# Patient Record
Sex: MALE | Race: WHITE | NOT HISPANIC OR LATINO | Employment: FULL TIME | URBAN - NONMETROPOLITAN AREA
[De-identification: names, ages, dates, MRNs, and addresses within clinical notes are randomized per-mention and may not be internally consistent; named-entity substitution may affect disease eponyms.]

---

## 2022-03-24 DIAGNOSIS — E53.8 DEFICIENCY OF OTHER SPECIFIED B GROUP VITAMINS: ICD-10-CM

## 2022-03-28 RX ORDER — CYANOCOBALAMIN 1000 UG/ML
INJECTION, SOLUTION INTRAMUSCULAR; SUBCUTANEOUS
Qty: 4 ML | Refills: 1 | Status: SHIPPED | OUTPATIENT
Start: 2022-03-28 | End: 2022-04-22

## 2022-03-28 NOTE — TELEPHONE ENCOUNTER
Rx Refill Note  Requested Prescriptions     Pending Prescriptions Disp Refills   • cyanocobalamin 1000 MCG/ML injection [Pharmacy Med Name: CYANOCOBALAMIN 1,000 MCG/ML VL] 4 mL 1     Sig: INJECT 1 MILLILITER BY INTRAMUSCULAR ROUTE EACH WEEK FOR 4 WEEKS      Last office visit with prescribing clinician 01/17/2022  Next office visit with prescribing clinician none  3}     Please add Last Relevant Lab Date if appropriate 01/17/2022201/17/20223}    Is Refill Pharmacy correct yes23}  Matthew Escobar MA  03/28/22, 12:48 EDT

## 2022-04-22 DIAGNOSIS — E53.8 DEFICIENCY OF OTHER SPECIFIED B GROUP VITAMINS: ICD-10-CM

## 2022-04-22 NOTE — TELEPHONE ENCOUNTER
Rx Refill Note    Requested Prescriptions     Pending Prescriptions Disp Refills   • cyanocobalamin 1000 MCG/ML injection [Pharmacy Med Name: CYANOCOBALAMIN 1,000 MCG/ML VL] 4 mL 0     Sig: INJECT 1 MILLILITER BY INTRAMUSCULAR ROUTE EACH WEEK FOR 4 WEEKS        Last office visit with prescribing clinician: [facility] 01/17/22     Next office visit with prescribing clinician: Visit date not found   Last labs:   Last refill: 03/03/22   Pharmacy (be sure to add in Epic). correct

## 2022-04-23 RX ORDER — CYANOCOBALAMIN 1000 UG/ML
INJECTION, SOLUTION INTRAMUSCULAR; SUBCUTANEOUS
Qty: 4 ML | Refills: 0 | Status: SHIPPED | OUTPATIENT
Start: 2022-04-23 | End: 2022-10-14

## 2022-06-20 ENCOUNTER — OFFICE VISIT (OUTPATIENT)
Dept: FAMILY MEDICINE CLINIC | Facility: CLINIC | Age: 57
End: 2022-06-20

## 2022-06-20 VITALS
WEIGHT: 209 LBS | DIASTOLIC BLOOD PRESSURE: 80 MMHG | RESPIRATION RATE: 14 BRPM | HEART RATE: 80 BPM | BODY MASS INDEX: 27.7 KG/M2 | SYSTOLIC BLOOD PRESSURE: 132 MMHG | HEIGHT: 73 IN

## 2022-06-20 DIAGNOSIS — E11.65 TYPE 2 DIABETES MELLITUS WITH HYPERGLYCEMIA, WITHOUT LONG-TERM CURRENT USE OF INSULIN: Primary | ICD-10-CM

## 2022-06-20 DIAGNOSIS — E53.8 B12 DEFICIENCY: ICD-10-CM

## 2022-06-20 DIAGNOSIS — I10 PRIMARY HYPERTENSION: ICD-10-CM

## 2022-06-20 PROCEDURE — 99214 OFFICE O/P EST MOD 30 MIN: CPT | Performed by: NURSE PRACTITIONER

## 2022-06-20 PROCEDURE — 36415 COLL VENOUS BLD VENIPUNCTURE: CPT | Performed by: NURSE PRACTITIONER

## 2022-06-20 RX ORDER — IBUPROFEN AND FAMOTIDINE 26.6; 8 MG/1; MG/1
800 TABLET, FILM COATED ORAL 2 TIMES DAILY
Qty: 180 TABLET | Refills: 1 | Status: SHIPPED | OUTPATIENT
Start: 2022-06-20 | End: 2022-11-02 | Stop reason: SDUPTHER

## 2022-06-20 RX ORDER — GLIMEPIRIDE 1 MG/1
1 TABLET ORAL DAILY
COMMUNITY
Start: 2022-04-02 | End: 2022-06-20 | Stop reason: SDUPTHER

## 2022-06-20 RX ORDER — IBUPROFEN AND FAMOTIDINE 26.6; 8 MG/1; MG/1
TABLET, FILM COATED ORAL
COMMUNITY
End: 2022-06-20 | Stop reason: SDUPTHER

## 2022-06-20 RX ORDER — LOSARTAN POTASSIUM 100 MG/1
100 TABLET ORAL DAILY
Qty: 90 TABLET | Refills: 1 | Status: SHIPPED | OUTPATIENT
Start: 2022-06-20 | End: 2023-01-23

## 2022-06-20 RX ORDER — GLIMEPIRIDE 1 MG/1
1 TABLET ORAL DAILY
Qty: 90 TABLET | Refills: 1 | Status: SHIPPED | OUTPATIENT
Start: 2022-06-20 | End: 2022-11-07

## 2022-06-20 RX ORDER — LOSARTAN POTASSIUM 100 MG/1
100 TABLET ORAL DAILY
COMMUNITY
End: 2022-06-20 | Stop reason: SDUPTHER

## 2022-06-20 NOTE — PROGRESS NOTES
"Chief Complaint  Diabetes, Hypertension, and Vitamin b12 deficiency    Subjective        Anshul Fowler presents to [facility]  History of Present Illness Hugo comes in today for med refills.  He is not checking his fingerstick blood sugars but states that he feels well and he is compliant with taking his medicines.  His BP has been within normal limits and he denies chest pain shortness of breath and swelling of the feet and legs.    Objective   Vital Signs:  /80 (BP Location: Left arm, Patient Position: Sitting, Cuff Size: Large Adult)   Pulse 80   Resp 14   Ht 185.4 cm (73\")   Wt 94.8 kg (209 lb)   BMI 27.57 kg/m²   Estimated body mass index is 27.57 kg/m² as calculated from the following:    Height as of this encounter: 185.4 cm (73\").    Weight as of this encounter: 94.8 kg (209 lb).          Physical Exam  Vitals and nursing note reviewed.   Constitutional:       Appearance: Normal appearance. He is obese.   HENT:      Head: Normocephalic and atraumatic.      Right Ear: Tympanic membrane and ear canal normal.      Left Ear: Tympanic membrane and ear canal normal.      Nose: Nose normal.      Mouth/Throat:      Mouth: Mucous membranes are dry.      Pharynx: Oropharynx is clear.   Eyes:      Extraocular Movements: Extraocular movements intact.      Conjunctiva/sclera: Conjunctivae normal.      Pupils: Pupils are equal, round, and reactive to light.   Cardiovascular:      Rate and Rhythm: Normal rate and regular rhythm.      Heart sounds: Normal heart sounds.   Pulmonary:      Effort: Pulmonary effort is normal.      Breath sounds: Normal breath sounds.   Abdominal:      General: Abdomen is flat. Bowel sounds are normal. There is no distension.      Palpations: Abdomen is soft.      Tenderness: There is no abdominal tenderness. There is no guarding or rebound.   Musculoskeletal:         General: Normal range of motion.      Cervical back: Normal range of motion and neck " supple.   Skin:     General: Skin is warm and dry.      Capillary Refill: Capillary refill takes less than 2 seconds.   Neurological:      General: No focal deficit present.      Mental Status: He is alert and oriented to person, place, and time. Mental status is at baseline.   Psychiatric:         Mood and Affect: Mood normal.         Behavior: Behavior normal.         Thought Content: Thought content normal.         Judgment: Judgment normal.        Result Review :                Assessment and Plan   Diagnoses and all orders for this visit:    1. Type 2 diabetes mellitus with hyperglycemia, without long-term current use of insulin ([facility]) (Primary)  Assessment & Plan:  Diabetes is improving with treatment.   Continue current treatment regimen.  Diabetes will be reassessed in 3 months.    Orders:  -     glimepiride (AMARYL) 1 MG tablet; Take 1 tablet by mouth Daily.  Dispense: 90 tablet; Refill: 1  -     Vitamin B12; Future  -     CBC (No Diff); Future  -     Comprehensive metabolic panel; Future  -     Lipid panel; Future  -     Hemoglobin A1c; Future  -     Hemoglobin A1c  -     Lipid panel  -     Comprehensive metabolic panel  -     CBC (No Diff)  -     Vitamin B12  -     Specimen Status Report  -     Vitamin B12    2. B12 deficiency  Assessment & Plan:  Continue monthly B12 injections    Orders:  -     Vitamin B12; Future  -     Vitamin B12    3. Primary hypertension  Assessment & Plan:  Hypertension is improving with treatment.  Continue current treatment regimen.  Blood pressure will be reassessed at the next regular appointment.    Orders:  -     losartan (COZAAR) 100 MG tablet; Take 1 tablet by mouth Daily.  Dispense: 90 tablet; Refill: 1    Other orders  -     Ibuprofen-Famotidine (Duexis) 800-26.6 MG tablet; Take 800 mg by mouth 2 (Two) Times a Day.  Dispense: 180 tablet; Refill: 1           Follow Up   Return in about 3 months (around 9/20/2022) for Recheck.  Patient was given instructions and counseling  regarding his condition or for health maintenance advice. Please see specific information pulled into the AVS if appropriate.

## 2022-06-21 LAB
ALBUMIN SERPL-MCNC: 4.3 G/DL (ref 3.8–4.9)
ALBUMIN/GLOB SERPL: 1.5 {RATIO} (ref 1.2–2.2)
ALP SERPL-CCNC: 100 IU/L (ref 44–121)
ALT SERPL-CCNC: 10 IU/L (ref 0–44)
AST SERPL-CCNC: 10 IU/L (ref 0–40)
BILIRUB SERPL-MCNC: 0.4 MG/DL (ref 0–1.2)
BUN SERPL-MCNC: 11 MG/DL (ref 6–24)
BUN/CREAT SERPL: 13 (ref 9–20)
CALCIUM SERPL-MCNC: 9.3 MG/DL (ref 8.7–10.2)
CHLORIDE SERPL-SCNC: 102 MMOL/L (ref 96–106)
CHOLEST SERPL-MCNC: 211 MG/DL (ref 100–199)
CO2 SERPL-SCNC: 22 MMOL/L (ref 20–29)
CREAT SERPL-MCNC: 0.83 MG/DL (ref 0.76–1.27)
EGFRCR SERPLBLD CKD-EPI 2021: 104 ML/MIN/1.73
ERYTHROCYTE [DISTWIDTH] IN BLOOD BY AUTOMATED COUNT: 13.3 % (ref 11.6–15.4)
GLOBULIN SER CALC-MCNC: 2.9 G/DL (ref 1.5–4.5)
GLUCOSE SERPL-MCNC: 138 MG/DL (ref 65–99)
HBA1C MFR BLD: 7.1 % (ref 4.8–5.6)
HCT VFR BLD AUTO: 46.8 % (ref 37.5–51)
HDLC SERPL-MCNC: 36 MG/DL
HGB BLD-MCNC: 15.9 G/DL (ref 13–17.7)
LDLC SERPL CALC-MCNC: 140 MG/DL (ref 0–99)
MCH RBC QN AUTO: 29.4 PG (ref 26.6–33)
MCHC RBC AUTO-ENTMCNC: 34 G/DL (ref 31.5–35.7)
MCV RBC AUTO: 87 FL (ref 79–97)
PLATELET # BLD AUTO: 267 X10E3/UL (ref 150–450)
POTASSIUM SERPL-SCNC: 4.4 MMOL/L (ref 3.5–5.2)
PROT SERPL-MCNC: 7.2 G/DL (ref 6–8.5)
RBC # BLD AUTO: 5.4 X10E6/UL (ref 4.14–5.8)
SODIUM SERPL-SCNC: 139 MMOL/L (ref 134–144)
SPECIMEN STATUS: NORMAL
TRIGL SERPL-MCNC: 192 MG/DL (ref 0–149)
VIT B12 SERPL-MCNC: NORMAL PG/ML
VLDLC SERPL CALC-MCNC: 35 MG/DL (ref 5–40)
WBC # BLD AUTO: 12.4 X10E3/UL (ref 3.4–10.8)

## 2022-06-22 LAB — VIT B12 SERPL-MCNC: 364 PG/ML (ref 232–1245)

## 2022-10-14 ENCOUNTER — OFFICE VISIT (OUTPATIENT)
Dept: FAMILY MEDICINE CLINIC | Facility: CLINIC | Age: 57
End: 2022-10-14

## 2022-10-14 VITALS
TEMPERATURE: 98.7 F | HEART RATE: 75 BPM | HEIGHT: 73 IN | DIASTOLIC BLOOD PRESSURE: 102 MMHG | BODY MASS INDEX: 27.55 KG/M2 | OXYGEN SATURATION: 99 % | WEIGHT: 207.9 LBS | SYSTOLIC BLOOD PRESSURE: 144 MMHG

## 2022-10-14 DIAGNOSIS — Z23 ENCOUNTER FOR IMMUNIZATION: ICD-10-CM

## 2022-10-14 DIAGNOSIS — E11.65 TYPE 2 DIABETES MELLITUS WITH HYPERGLYCEMIA, WITHOUT LONG-TERM CURRENT USE OF INSULIN: ICD-10-CM

## 2022-10-14 DIAGNOSIS — G89.29 CHRONIC RIGHT-SIDED LOW BACK PAIN WITH RIGHT-SIDED SCIATICA: Primary | ICD-10-CM

## 2022-10-14 DIAGNOSIS — M54.41 CHRONIC RIGHT-SIDED LOW BACK PAIN WITH RIGHT-SIDED SCIATICA: Primary | ICD-10-CM

## 2022-10-14 LAB
POC CREATININE URINE: NORMAL
POC MICROALBUMIN URINE: 0

## 2022-10-14 PROCEDURE — 82044 UR ALBUMIN SEMIQUANTITATIVE: CPT | Performed by: PHYSICIAN ASSISTANT

## 2022-10-14 PROCEDURE — 90471 IMMUNIZATION ADMIN: CPT | Performed by: PHYSICIAN ASSISTANT

## 2022-10-14 PROCEDURE — 90686 IIV4 VACC NO PRSV 0.5 ML IM: CPT | Performed by: PHYSICIAN ASSISTANT

## 2022-10-14 PROCEDURE — 99214 OFFICE O/P EST MOD 30 MIN: CPT | Performed by: PHYSICIAN ASSISTANT

## 2022-10-14 RX ORDER — IBUPROFEN AND FAMOTIDINE 26.6; 8 MG/1; MG/1
800 TABLET, FILM COATED ORAL 2 TIMES DAILY
Qty: 180 TABLET | Refills: 1 | Status: CANCELLED | OUTPATIENT
Start: 2022-10-14

## 2022-10-14 RX ORDER — TADALAFIL 5 MG/1
1 TABLET ORAL AS NEEDED
COMMUNITY
Start: 2022-09-30

## 2022-10-14 NOTE — PROGRESS NOTES
"      Patient Office Visit      Patient Name: Anshul Fowler  : 1965   MRN: [id number]     Chief Complaint:    Chief Complaint   Patient presents with   • Back Pain       History of Present Illness: Anshul Fowler is a 57 y.o. male who is here today with back pain which is gradually worsening.  He has pain that radiates to both thighs but now has pain radiating down his right leg all the way to his ankle.  He teaches at a vocational school in [place] and he said it is getting to the point where sometimes he thinks that he is going to fall due to his back and leg giving way.  He has a remote history of degenerative disc disease but has been years since his last MRI.    Subjective      Review of Systems:   Review of Systems   Musculoskeletal: Positive for back pain.        Past Medical History:   Past Medical History:   Diagnosis Date   • DDD (degenerative disc disease), lumbar        Past Surgical History:   Past Surgical History:   Procedure Laterality Date   • CYSTOSCOPY W/ LASER LITHOTRIPSY         Family History:   Family History   Family history unknown: Yes       Social History:   Social History     Socioeconomic History   • Marital status:    Tobacco Use   • Smoking status: Every Day     Packs/day: 1.00     Years: 38.00     Pack years: 38.00     Types: Cigarettes     Passive exposure: Current   • Smokeless tobacco: Never   Vaping Use   • Vaping Use: Never used   Substance and Sexual Activity   • Alcohol use: Not Currently   • Drug use: Never   • Sexual activity: Defer       Allergies:   Allergies   Allergen Reactions   • Penicillins Rash       Objective     Physical Exam:  Vital Signs:   Vitals:    10/14/22 1353   BP: (!) 144/102   BP Location: Left arm   Patient Position: Sitting   Cuff Size: Adult   Pulse: 75   Temp: 98.7 °F (37.1 °C)   TempSrc: Temporal   SpO2: 99%   Weight: 94.3 kg (207 lb 14.4 oz)   Height: 185.4 cm (73\")   PainSc:   6   PainLoc: Leg     Body mass index is 27.43 kg/m².    "     Physical Exam  Constitutional:       General: He is not in acute distress.     Appearance: Normal appearance.   Musculoskeletal:      Lumbar back: Tenderness present. No spasms or bony tenderness. Normal range of motion. Positive right straight leg raise test. Negative left straight leg raise test.      Comments: His patellar reflexes on the right are asymmetric.  1+ on the right and 3+ on the left.   Neurological:      Mental Status: He is alert.   Psychiatric:         Mood and Affect: Mood normal.         Behavior: Behavior normal.         Thought Content: Thought content normal.         Judgment: Judgment normal.         Procedures    Assessment / Plan      Assessment/Plan:   Diagnoses and all orders for this visit:    1. Chronic right-sided low back pain with right-sided sciatica (Primary)  Assessment & Plan:  We will get a lumbar spine x-ray followed by an MRI.  Anticipate referral to neurosurgery.  He has a known history of lumbar degenerative disc disease and has abnormal neurologic findings with asymmetric patellar reflexes.  He has also had instances where he feels like his leg gives out and is afraid he is going to fall.    Orders:  -     XR Spine Lumbar 2 or 3 View; Future  -     MRI Lumbar Spine Without Contrast; Future    2. Type 2 diabetes mellitus with hyperglycemia, without long-term current use of insulin ([facility])  -     POC Microalbumin    3. Encounter for immunization  -     FluLaval/Fluarix/Fluzone >6 Months       Medications:     Current Outpatient Medications:   •  glimepiride (AMARYL) 1 MG tablet, Take 1 tablet by mouth Daily., Disp: 90 tablet, Rfl: 1  •  Ibuprofen-Famotidine (Duexis) 800-26.6 MG tablet, Take 800 mg by mouth 2 (Two) Times a Day., Disp: 180 tablet, Rfl: 1  •  losartan (COZAAR) 100 MG tablet, Take 1 tablet by mouth Daily., Disp: 90 tablet, Rfl: 1  •  tadalafil (CIALIS) 5 MG tablet, Take 1 tablet by mouth As Needed., Disp: , Rfl:     I spent 30 minutes caring for Anshul on this  date of service. This time includes time spent by me in the following activities:preparing for the visit, obtaining and/or reviewing a separately obtained history, performing a medically appropriate examination and/or evaluation , counseling and educating the patient/family/caregiver, ordering medications, tests, or procedures and documenting information in the medical record    Follow Up:   Return if symptoms worsen or fail to improve.    Saritha Gandara PA-C   [facility]

## 2022-10-14 NOTE — ASSESSMENT & PLAN NOTE
We will get a lumbar spine x-ray followed by an MRI.  Anticipate referral to neurosurgery.  He has a known history of lumbar degenerative disc disease and has abnormal neurologic findings with asymmetric patellar reflexes.  He has also had instances where he feels like his leg gives out and is afraid he is going to fall.

## 2022-10-19 ENCOUNTER — TELEPHONE (OUTPATIENT)
Dept: FAMILY MEDICINE CLINIC | Facility: CLINIC | Age: 57
End: 2022-10-19

## 2022-10-19 NOTE — TELEPHONE ENCOUNTER
----- Message from MOISES Velez sent at 10/18/2022  6:48 PM EDT -----  X-ray confirms at least moderate lumbar degenerative disc disease.  Hopefully we will be able to get his MRI approved.

## 2022-11-02 DIAGNOSIS — E11.65 TYPE 2 DIABETES MELLITUS WITH HYPERGLYCEMIA, WITHOUT LONG-TERM CURRENT USE OF INSULIN: ICD-10-CM

## 2022-11-02 RX ORDER — IBUPROFEN AND FAMOTIDINE 26.6; 8 MG/1; MG/1
800 TABLET, FILM COATED ORAL 2 TIMES DAILY
Qty: 180 TABLET | Refills: 1 | Status: SHIPPED | OUTPATIENT
Start: 2022-11-02

## 2022-11-02 NOTE — TELEPHONE ENCOUNTER
Caller: ESAU VILLAR    Relationship: Emergency Contact    Best call back number: [phone]    Requested Prescriptions:   Requested Prescriptions     Pending Prescriptions Disp Refills   • Ibuprofen-Famotidine (Duexis) 800-26.6 MG tablet 180 tablet 1     Sig: Take 800 mg by mouth 2 (Two) Times a Day.        Pharmacy where request should be sent: [facility]Y #[id number] - [place] [id number] [facility] [phone] [facility] [phone] FX     Additional details provided by patient: OUT OF MEDICATION    Does the patient have less than a 3 day supply:  [x] Yes  [] No    Nisha Donahue Rep   11/02/22 12:27 EDT

## 2022-11-07 RX ORDER — GLIMEPIRIDE 1 MG/1
TABLET ORAL
Qty: 90 TABLET | Refills: 1 | Status: SHIPPED | OUTPATIENT
Start: 2022-11-07

## 2022-11-20 ENCOUNTER — HOSPITAL ENCOUNTER (OUTPATIENT)
Dept: MRI IMAGING | Facility: HOSPITAL | Age: 57
Discharge: HOME | End: 2022-11-20
Admitting: PHYSICIAN ASSISTANT

## 2022-11-20 DIAGNOSIS — M54.41 CHRONIC RIGHT-SIDED LOW BACK PAIN WITH RIGHT-SIDED SCIATICA: ICD-10-CM

## 2022-11-20 DIAGNOSIS — G89.29 CHRONIC RIGHT-SIDED LOW BACK PAIN WITH RIGHT-SIDED SCIATICA: ICD-10-CM

## 2022-11-20 PROCEDURE — 72148 MRI LUMBAR SPINE W/O DYE: CPT

## 2022-11-21 DIAGNOSIS — M54.41 CHRONIC RIGHT-SIDED LOW BACK PAIN WITH RIGHT-SIDED SCIATICA: Primary | ICD-10-CM

## 2022-11-21 DIAGNOSIS — G89.29 CHRONIC RIGHT-SIDED LOW BACK PAIN WITH RIGHT-SIDED SCIATICA: Primary | ICD-10-CM

## 2022-11-22 ENCOUNTER — TELEPHONE (OUTPATIENT)
Dept: FAMILY MEDICINE CLINIC | Facility: CLINIC | Age: 57
End: 2022-11-22

## 2022-11-22 NOTE — TELEPHONE ENCOUNTER
----- Message from MOISES Velez sent at 11/21/2022  9:06 AM EST -----  Please let patient know that the MRI of his lumbar spine shows multilevel degenerative disc disease.  There is a disc bulge at L4-5 that displaces the right L4 nerve root which correlates with his symptoms.  I will go ahead and make a referral to neurosurgery, [facility].

## 2022-12-30 ENCOUNTER — OFFICE VISIT (OUTPATIENT)
Dept: NEUROSURGERY | Facility: CLINIC | Age: 57
End: 2022-12-30

## 2022-12-30 VITALS
DIASTOLIC BLOOD PRESSURE: 82 MMHG | TEMPERATURE: 97.2 F | BODY MASS INDEX: 26.93 KG/M2 | SYSTOLIC BLOOD PRESSURE: 130 MMHG | HEIGHT: 73 IN | WEIGHT: 203.2 LBS

## 2022-12-30 DIAGNOSIS — Z72.0 TOBACCO ABUSE: ICD-10-CM

## 2022-12-30 DIAGNOSIS — M51.36 DDD (DEGENERATIVE DISC DISEASE), LUMBAR: ICD-10-CM

## 2022-12-30 DIAGNOSIS — M54.16 LUMBAR RADICULOPATHY: Primary | ICD-10-CM

## 2022-12-30 PROCEDURE — 99204 OFFICE O/P NEW MOD 45 MIN: CPT | Performed by: NEUROLOGICAL SURGERY

## 2022-12-30 NOTE — PROGRESS NOTES
NAME: SHARON VILLAR   DOS: 2022  : 1965  PCP: Tahira Hdez APRN    Chief Complaint:    Chief Complaint   Patient presents with   • Back Pain       History of Present Illness:  57 y.o. male   I saw this 57-year-old male in neurosurgical consultation he has been seen in the past for lumbar spinal issues including L3-4 and L4-5 degenerative disc disease: Artery years ago he is a heavy smoker he is a  and a past  he reports interesting symptomatology frequent throat bouts of the back most recently he had some right-sided buttock hip and leg pain that seem reminiscent of L5 radiculopathy and he complains of pain from the right fibular head down to his leg he is a diabetes with an A1c of 7.8.  He denies bowel bladder incontinence issues.  He is a daily smoker he is here for evaluation    PMHX  Allergies:  Allergies   Allergen Reactions   • Penicillins Rash     Medications    Current Outpatient Medications:   •  glimepiride (AMARYL) 1 MG tablet, TAKE 1 TABLET BY MOUTH EVERY DAY, Disp: 90 tablet, Rfl: 1  •  Ibuprofen-Famotidine (Duexis) 800-26.6 MG tablet, Take 800 mg by mouth 2 (Two) Times a Day., Disp: 180 tablet, Rfl: 1  •  losartan (COZAAR) 100 MG tablet, Take 1 tablet by mouth Daily., Disp: 90 tablet, Rfl: 1  •  tadalafil (CIALIS) 5 MG tablet, Take 1 tablet by mouth As Needed., Disp: , Rfl:   Past Medical History:  Past Medical History:   Diagnosis Date   • DDD (degenerative disc disease), lumbar    • Diabetes mellitus (HCC)    • Hypertension      Past Surgical History:  Past Surgical History:   Procedure Laterality Date   • CYSTOSCOPY W/ LASER LITHOTRIPSY       Social Hx:  Social History     Tobacco Use   • Smoking status: Every Day     Packs/day: 1.00     Years: 30.00     Pack years: 30.00     Types: Cigarettes     Passive exposure: Current   • Smokeless tobacco: Never   Vaping Use   • Vaping Use: Never used   Substance Use Topics   • Alcohol use: Not Currently   • Drug use:  Never     Family Hx:  Family History   Problem Relation Age of Onset   • Arthritis Mother    • Hyperlipidemia Mother    • Arthritis Father    • Cancer Father    • Diabetes Father    • Heart disease Father    • Hyperlipidemia Father      Review of Systems:        Review of Systems   Constitutional: Negative for activity change, appetite change, chills, diaphoresis, fatigue, fever and unexpected weight change.   HENT: Negative for congestion, dental problem, drooling, ear discharge, ear pain, facial swelling, hearing loss, mouth sores, nosebleeds, postnasal drip, rhinorrhea, sinus pressure, sinus pain, sneezing, sore throat, tinnitus, trouble swallowing and voice change.    Eyes: Negative for photophobia, pain, discharge, redness, itching and visual disturbance.   Respiratory: Negative for apnea, cough, choking, chest tightness, shortness of breath, wheezing and stridor.    Cardiovascular: Negative for chest pain, palpitations and leg swelling.   Gastrointestinal: Negative for abdominal distention, abdominal pain, anal bleeding, blood in stool, constipation, diarrhea, nausea, rectal pain and vomiting.   Endocrine: Negative for cold intolerance, heat intolerance, polydipsia, polyphagia and polyuria.   Genitourinary: Negative for decreased urine volume, difficulty urinating, dysuria, enuresis, flank pain, frequency, genital sores, hematuria, penile discharge, penile pain, penile swelling, scrotal swelling, testicular pain and urgency.   Musculoskeletal: Positive for back pain. Negative for arthralgias, gait problem, joint swelling, myalgias, neck pain and neck stiffness.   Skin: Negative for color change, pallor, rash and wound.   Allergic/Immunologic: Negative for environmental allergies, food allergies and immunocompromised state.   Neurological: Negative for dizziness, tremors, seizures, syncope, facial asymmetry, speech difficulty, weakness, light-headedness, numbness and headaches.   Hematological: Negative for  adenopathy. Does not bruise/bleed easily.   Psychiatric/Behavioral: Negative for agitation, behavioral problems, confusion, decreased concentration, dysphoric mood, hallucinations, self-injury, sleep disturbance and suicidal ideas. The patient is not nervous/anxious and is not hyperactive.       I have reviewed this note template and all pertinent parts of the review of systems social, family history, surgical history and medication list      Physical Examination:  Vitals:    12/30/22 1451   BP: 130/82   Temp: 97.2 °F (36.2 °C)      General Appearance:   Well developed, well nourished, well groomed, alert, and cooperative.  Neurological examination:  Neurologic Exam  Vital signs were reviewed and documented in the chart  Patient appeared in good neurologic function with normal comprehension fluent speech  Mood and affect are normal  Sense of smell deferred  COVID mass left in place    Smoker's cough      Muscle bulk and tone normal  5 out of 5 strength no motor drift  Gait normal intact  Negative Romberg  No clonus long tract signs or myelopathy  Decreased vibratory sensation bilaterally consistent with peripheral neuropathy  Reflexes diffusely areflexic  No edema noted and extremities skin appears normal    Straight leg raise sign absent  No signs of intrinsic hip dysfunction  Back is without any lesions or abnormality  Feet are warm and well perfused        Review of Imaging/DATA:  I reviewed his MRI personally of the lumbar spine is got L3-4 L4-5 with L4-5 likely lateral recess syndrome small disc herniation not improbable    L3-4 shows some lateral recess syndrome as well he is got multiple levels of spondylosis the central canal is widely patent  Diagnoses/Plan:    Mr. Fowler is a 57 y.o. male   1.  Predominantly right-sided sciatic leg pain L4-5    2.  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  Chronic axial back pain greater than 20 years    4.  Suspected COPD ongoing tobacco use diabetes    5.  Suspected  peripheral neuropathy    I explained the risk benefits and expected outcome of major elective surgery for their problem, complications from approach, and infection, the risk of neurologic implications after surgery as well as need for repeat surgeries and most importantly failure to achieve quality of life improvement from the surgery to the patient.  I explained the logic behind deferring initial surgical care from a neurosurgical standpoint right now I see nothing that would be a simple fix it would not be unreasonable to consider a right-sided L4-5 lamina foraminotomy for evaluation of an inferiorly extruded disc but I did explain that this would not help with his chronic axial back pain but may assist with his recent abrupt onset of 1 year of right-sided lower extremity pain    This is also complicated by history of likely peripheral neuropathy    From a neurosurgical standpoint we recommended lumbar epidural steroid blocks    He was not really interested in surgery I be happy to revisit it if he does not have robust relief with nonsurgical means    Otherwise recommended conservative management lumbar epidural steroid blocks etc. I be happy to reconsider a right-sided transforaminal decompression at L4-5 and discectomy should his symptoms persist    And less likely foraminotomies at 3 4    I explained the importance of smoking cessation to the patient explaining that smoking decreases the efficacy of surgical therapies not to mention the general health risks.  In addition to this when contemplating fusion surgeries smoking decreases fusion rates and leads to poor outcome, and contributes to complication rate.  I was exhaustive in the explaining the complications of smoking

## 2023-01-16 ENCOUNTER — OFFICE VISIT (OUTPATIENT)
Dept: PAIN MEDICINE | Facility: CLINIC | Age: 58
End: 2023-01-16
Payer: COMMERCIAL

## 2023-01-16 VITALS
RESPIRATION RATE: 12 BRPM | WEIGHT: 204.4 LBS | TEMPERATURE: 96.9 F | DIASTOLIC BLOOD PRESSURE: 88 MMHG | HEIGHT: 73 IN | HEART RATE: 72 BPM | BODY MASS INDEX: 27.09 KG/M2 | SYSTOLIC BLOOD PRESSURE: 136 MMHG | OXYGEN SATURATION: 93 %

## 2023-01-16 DIAGNOSIS — G89.29 CHRONIC RIGHT-SIDED LOW BACK PAIN WITH RIGHT-SIDED SCIATICA: ICD-10-CM

## 2023-01-16 DIAGNOSIS — M47.26 OSTEOARTHRITIS OF SPINE WITH RADICULOPATHY, LUMBAR REGION: Primary | ICD-10-CM

## 2023-01-16 DIAGNOSIS — M54.41 CHRONIC RIGHT-SIDED LOW BACK PAIN WITH RIGHT-SIDED SCIATICA: ICD-10-CM

## 2023-01-16 PROCEDURE — 99204 OFFICE O/P NEW MOD 45 MIN: CPT | Performed by: STUDENT IN AN ORGANIZED HEALTH CARE EDUCATION/TRAINING PROGRAM

## 2023-01-16 RX ORDER — IBUPROFEN 800 MG/1
800 TABLET ORAL 2 TIMES DAILY PRN
COMMUNITY

## 2023-01-16 NOTE — PROGRESS NOTES
01/16/2023      Referring Physician: Javier Love MD  [id number] [facility] [address]  [address]  [place] [address]    Primary Physician: Tahira Hdez APRN    CHIEF COMPLAINT or REASON FOR VISIT: Back Pain      HISTORY OF PRESENT ILLNESS:  Mr. Anshul Fowler is 57 y.o. male who presents as a new patient referral for evaluation treatment of chronic low back pain with radiation to bilateral lower extremities.  Patient states that he has a many decade history of axial low back pain however over the last year he is begun to have radiation into his bilateral lower extremities and the soles of bilateral feet.  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  That subsequently mostly resolved except for left-sided pinky toe numbness tingling and pain.  Patient additionally complains of frequent right lower extremity shooting pain, worst from the right fibular head down to the ankle.  Patient denies any bowel or bladder dysfunction, lower extremity weakness, new onset saddle anesthesia or unexplained weight loss.  He has never had physical therapy, back surgery, back injections.            Objective Pain Scoring:   BRIEF PAIN INVENTORY:  Total score:   Pain Score    01/16/23 0923   PainSc:   8   PainLoc: Back      PHQ-2: PHQ-2 Total Score: 0  PHQ-9: PHQ-9: Brief Depression Severity Measure Score: 0  Opioid Risk Tool:         Review of Systems:   ROS negative except as otherwise noted     Past Medical History:   Past Medical History:   Diagnosis Date   • DDD (degenerative disc disease), lumbar    • Diabetes mellitus (HCC)    • Hypertension          Past Surgical History:   Past Surgical History:   Procedure Laterality Date   • CYSTOSCOPY W/ LASER LITHOTRIPSY           Family History   Family History   Problem Relation Age of Onset   • Arthritis Mother    • Hyperlipidemia Mother    • Arthritis  "Father    • Cancer Father    • Diabetes Father    • Heart disease Father    • Hyperlipidemia Father          Social History   Social History     Socioeconomic History   • Marital status:    Tobacco Use   • Smoking status: Every Day     Packs/day: 1.00     Years: 30.00     Pack years: 30.00     Types: Cigarettes     Passive exposure: Current   • Smokeless tobacco: Never   Vaping Use   • Vaping Use: Never used   Substance and Sexual Activity   • Alcohol use: Not Currently   • Drug use: Never   • Sexual activity: Yes     Partners: Female        Medications:     Current Outpatient Medications:   •  glimepiride (AMARYL) 1 MG tablet, TAKE 1 TABLET BY MOUTH EVERY DAY, Disp: 90 tablet, Rfl: 1  •  ibuprofen (ADVIL,MOTRIN) 800 MG tablet, Take 800 mg by mouth 2 (Two) Times a Day As Needed for Mild Pain., Disp: , Rfl:   •  losartan (COZAAR) 100 MG tablet, Take 1 tablet by mouth Daily., Disp: 90 tablet, Rfl: 1  •  tadalafil (CIALIS) 5 MG tablet, Take 1 tablet by mouth As Needed., Disp: , Rfl:   •  Ibuprofen-Famotidine (Duexis) 800-26.6 MG tablet, Take 800 mg by mouth 2 (Two) Times a Day., Disp: 180 tablet, Rfl: 1        Physical Exam:     Vitals:    01/16/23 0923   BP: 136/88   BP Location: Left arm   Patient Position: Sitting   Cuff Size: Adult   Pulse: 72   Resp: 12   Temp: 96.9 °F (36.1 °C)   TempSrc: Infrared   SpO2: 93%   Weight: 92.7 kg (204 lb 6.4 oz)   Height: 185.4 cm (73\")   PainSc:   8   PainLoc: Back        General: Alert and oriented, No acute distress.   HEENT: Normocephalic, atraumatic.   Cardiovascular: No gross edema  Respiratory: Respirations are non-labored  Thoracic Spine:   Inspection: no gross abnormality  Paraspinal muscle palpation: nontender  Spinous process palpation: nontender    Lumbar Spine:   No masses or atrophy  Range of motion - Flexion normal. Extension normal. Right Lat Bending normal. Left Lat Bending normal  Facet Loading: Positive bilaterally  Facet Palpation - tender " bilaterally  PSIS tenderness - Negative bilaterally  Sulaiman's/KIRSTIN/Thigh thrust - Negative bilaterally  Straight leg raise: Positive bilaterally  Slump test: Positive bilaterally    Motor Exam:  Strength: Rate on 1-5 scale Right Left    L1/2- hip flexion 5 5    L3- knee extension 5 5    L4- ankle dorsiflexion 5 5    L5- great toe extension 5 5    S1- ankle plantarflexion 5 5    Sensory Exam: Decreased sensation to light touch bilateral S1 dermatome  Positive Tinel's right fibular head/common peroneal nerve    Neurologic: Cranial Nerves II-XII are grossly intact.   Clonus -negative bilaterally    Psychiatric: Cooperative.   Gait: Normal   Assistive Devices: None    Imaging Studies:   Results for orders placed during the hospital encounter of 11/20/22    MRI Lumbar Spine Without Contrast    Narrative  Examination: MRI LUMBAR SPINE WO CONTRAST-    Date of Exam: 11/20/2022 10:14 AM    Indication: worsening right sided sciatica; M54.41-Lumbago with  sciatica, right side; G89.29-Other chronic pain.    Comparison: None available.    Technique: Standard noncontrast MR pulse sequences of the lumbar spine  were obtained.    Findings:  Five lumbar type vertebral bodies are identified.  Alignment is  anatomic.  There is mild-to-moderate narrowing of the L3-L4 and L4-L5  discs with desiccation and there is mild narrowing of the L1-L2 disc  with desiccation. There is similar mild narrowing and desiccation at the  T12-L1 disc. The L2-L3 and L5-S1 discs appear within normal limits.  There there is a small chronic Schmorl's node at the inferior L1  endplate. Vertebral bodies maintain normal height.  Distal spinal cord  and conus medullaris appear unremarkable terminating at the T12-L1  level.  Cauda equina appears unremarkable.  There is mild degenerative  bone marrow heterogeneity. No focal bone marrow edema or marrow  replacing process. Paraspinal musculature is preserved.  There is  colonic diverticulosis and there is scarring  in the pelvic fat, likely  related to remote diverticulitis.    L1-L2: There is concentric disc bulge and marginal osteophyte formation.  Facet joints appear unremarkable. There is mild bilateral neural  foraminal narrowing.    L2-L3: There is concentric disc bulge and mild facet and ligamentum  flavum hypertrophy. There is mild-to-moderate bilateral neural foraminal  narrowing without spinal canal compromise.    L3-L4: There is concentric disc bulge and extensive marginal osteophyte  formation. There is superimposed right paracentral and left foraminal  disc protrusion. There is moderate right and mild left facet and  ligamentum flavum hypertrophy. There is moderate bilateral neural  foraminal narrowing and moderate narrowing of the spinal canal.    L4-L5: There is concentric disc bulge and marginal osteophyte formation.  There is superimposed right foraminal disc protrusion. The disc contacts  and dorsally displaces the exited right L4 nerve. There is moderate  facet and ligamentum flavum hypertrophy. There is moderate bilateral  neural foraminal narrowing and mild narrowing of the spinal canal with  effacement of the lateral recesses.    L5-S1: There is slight concentric disc bulge and mild right facet  hypertrophy. No neural foraminal or spinal canal narrowing.    Impression  Moderate lumbar spondylosis with varying degrees of neural foraminal  narrowing and moderate narrowing of the spinal canal at the L3-L4 level.    This report was finalized on 11/20/2022 2:19 PM by Lincoln Kay MD.      Impression & Plan:   Mr. Anshul Fowler is a 57 y.o. male with past medical history significant for DM, HTN who presents to the pain clinic for evaluation and treatment of chronic low back pain with radiation to bilateral lower extremities and feet.  I personally reviewed the patient's lumbar MRI dated 11/20/2022 which demonstrates relatively well-preserved disc height with mild degenerative disc disease at L3/L4 and L4/L5  with posterior disc herniations causing bilateral NFS and lateral recess stenosis at L3/L4, right NFS at L4/L5, mild canal stenosis at L3/L4.  Additionally there is facet hypertrophy and joint effusions at L3/4 and L4/L5.    Clinical examination consistent with chronic axial low back pain secondary to facet degenerative disease with more recent radicular type symptoms.  I also have some suspicion for common peroneal nerve dysfunction.  I will obtain bilateral lower extremity EMG/NCV to assess.    1. Osteoarthritis of spine with radiculopathy, lumbar region        PLAN:  1. Medication Recommendations: Continue ibuprofen    2. Physical Therapy: Referral given today for low back pain    3. Psychological: defer    4. Complementary and alternative (CAM) Therapies:     5. Labs: None indicated     6. Imaging: Personally reviewed lumbar MRI in room with patient.  Obtain bilateral lower extremity EMG/NCV.    7. Interventions: Schedule diagnostic and therapeutic caudal epidural steroid injection (CPT 90244).  Consider bilateral L3, L4, L5 diagnostic lumbar medial branch blocks.    8. Referrals: None indicated     9. Records requested: n/a    10. Lifestyle goals:    Follow-up 2 months      [facility]  Nasir Omer MD

## 2023-01-18 ENCOUNTER — PATIENT ROUNDING (BHMG ONLY) (OUTPATIENT)
Dept: PAIN MEDICINE | Facility: CLINIC | Age: 58
End: 2023-01-18
Payer: COMMERCIAL

## 2023-01-18 NOTE — PROGRESS NOTES
A MY-CHART MESSAGE HAS BEEN SENT TO THE PATIENT FOR PATIENT ROUNDING WITH [facility].

## 2023-01-21 DIAGNOSIS — I10 PRIMARY HYPERTENSION: ICD-10-CM

## 2023-01-23 RX ORDER — LOSARTAN POTASSIUM 100 MG/1
TABLET ORAL
Qty: 90 TABLET | Refills: 0 | Status: SHIPPED | OUTPATIENT
Start: 2023-01-23

## 2023-01-23 NOTE — TELEPHONE ENCOUNTER
Rx Refill Note    Requested Prescriptions     Pending Prescriptions Disp Refills   • losartan (COZAAR) 100 MG tablet [Pharmacy Med Name: LOSARTAN POTASSIUM 100 MG TAB] 90 tablet 0     Sig: TAKE 1 TABLET BY MOUTH EVERY DAY        Last office visit with prescribing clinician: 6/20/2022      Next office visit with prescribing clinician: Visit date not found   Last labs:   Last refill: 06/20/2022   Pharmacy (be sure to add in Epic). correct

## 2023-02-07 ENCOUNTER — OUTSIDE FACILITY SERVICE (OUTPATIENT)
Dept: PAIN MEDICINE | Facility: CLINIC | Age: 58
End: 2023-02-07
Payer: COMMERCIAL

## 2023-02-07 ENCOUNTER — DOCUMENTATION (OUTPATIENT)
Dept: PAIN MEDICINE | Facility: CLINIC | Age: 58
End: 2023-02-07

## 2023-02-07 PROCEDURE — 62323 NJX INTERLAMINAR LMBR/SAC: CPT | Performed by: STUDENT IN AN ORGANIZED HEALTH CARE EDUCATION/TRAINING PROGRAM

## 2023-02-07 NOTE — PROGRESS NOTES
[facility]  [address] [place] [address]      PROCEDURE: Fluoroscopically-guided Caudal Epidural Steroid Injection     PRE-OP DIAGNOSIS: Lumbar radiculopathy  POST-OP DIAGNOSIS: Same    ANTIPLATELET/ANTICOAGULANT: Managed according to [place] guidelines. Patient is not on anticoagulation at baseline.    CONSENT: Risks, benefits and options were explained to the patient, all questions were answered and written informed consent was obtained.    ANESTHESIA: See anesthesia note  PROCEDURE NOTE:  A pre-procedural time out was performed to confirm the correct patient, procedure, side, and site. Standard ASA monitors were applied and oxygen via nasal cannula was provided. All proceduralists donned sterile gloves with masks and surgical hats. The patient was placed prone with pillow under the abdomen and all pressure points padded. The patient's lumbar spine and buttock region were prepped in standard fashion using DuraPrep and draped with sterile towels. The sacral midline was identified in the AP fluoroscopic view. The sacral hiatus was visualized under lateral fluoroscopic view. The overlying skin and subcutaneous tissue was anesthetized with 1% lidocaine. A 25 gauge 3.5 inch spinal needle was advanced using intermittent lateral fluoroscopy through the sacral hiatus and advanced toward the epidural space. Once the caudal canal was entered, AP and lateral images confirmed adequate needle placement. After negative aspiration, contrast (Omnipaque 180) was injected under live fluoroscopic imaging which confirmed an epidurogram without evidence of intravascular or intrathecal spread. After further negative aspiration, a mixture containing 6 ml of preservative-free normal saline, 40mg methylprednisolone steroid, lidocaine 1% - 3 ml local anesthetic for a total volume of 10 ml was injected under direct visualization with fluoroscopy. The Tuohy needle was flushed, removed and a bandage applied.      EBL: None    COMPLICATIONS: None  The patient was monitored for at least 30 minutes prior to discharge. Vital signs remained stable throughout the procedure and in the recovery area. There were no immediate complications and the patient tolerated the procedure well. Sensory and motor exam was unchanged from baseline. The patient received written discharge instructions prior to discharge.    FOLLOW UP: As scheduled    ADDITIONAL NOTES:       [facility]  Nasir Omer MD

## 2023-04-07 ENCOUNTER — OFFICE VISIT (OUTPATIENT)
Dept: PAIN MEDICINE | Facility: CLINIC | Age: 58
End: 2023-04-07
Payer: COMMERCIAL

## 2023-04-07 VITALS
DIASTOLIC BLOOD PRESSURE: 76 MMHG | OXYGEN SATURATION: 97 % | SYSTOLIC BLOOD PRESSURE: 132 MMHG | TEMPERATURE: 97.4 F | HEART RATE: 74 BPM | HEIGHT: 73 IN | RESPIRATION RATE: 14 BRPM | WEIGHT: 201.8 LBS | BODY MASS INDEX: 26.74 KG/M2

## 2023-04-07 DIAGNOSIS — M54.41 CHRONIC RIGHT-SIDED LOW BACK PAIN WITH RIGHT-SIDED SCIATICA: Primary | ICD-10-CM

## 2023-04-07 DIAGNOSIS — G89.29 CHRONIC RIGHT-SIDED LOW BACK PAIN WITH RIGHT-SIDED SCIATICA: Primary | ICD-10-CM

## 2023-04-07 DIAGNOSIS — M48.062 SPINAL STENOSIS OF LUMBAR REGION WITH NEUROGENIC CLAUDICATION: ICD-10-CM

## 2023-04-07 PROCEDURE — 99213 OFFICE O/P EST LOW 20 MIN: CPT | Performed by: STUDENT IN AN ORGANIZED HEALTH CARE EDUCATION/TRAINING PROGRAM

## 2023-04-07 RX ORDER — GABAPENTIN 300 MG/1
300 CAPSULE ORAL 3 TIMES DAILY
Qty: 90 CAPSULE | Refills: 1 | Status: SHIPPED | OUTPATIENT
Start: 2023-04-07

## 2023-04-07 NOTE — PROGRESS NOTES
Primary Physician: Tahira Hdez APRN    CHIEF COMPLAINT or REASON FOR VISIT: Follow-up and Back Pain      Initial HPI 1/16/2023:  Mr. Anshul Fowler is 57 y.o. male who presents as a new patient referral for evaluation treatment of chronic low back pain with radiation to bilateral lower extremities.  Patient states that he has a many decade history of axial low back pain however over the last year he is begun to have radiation into his bilateral lower extremities and the soles of bilateral feet.  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  That subsequently mostly resolved except for left-sided pinky toe numbness tingling and pain.  Patient additionally complains of frequent right lower extremity shooting pain, worst from the right fibular head down to the ankle.  Patient denies any bowel or bladder dysfunction, lower extremity weakness, new onset saddle anesthesia or unexplained weight loss.  He has never had physical therapy, back surgery, back injections.    Interval history: Patient returns to clinic after undergoing a caudal epidural steroid injection in February.  He states that he was very pleased with the result and felt amazing for approximately 2 weeks with no pain whatsoever.  Unfortunately then pain began to return and now is back to an 8 out of 10.  Continues to complain of back and primarily right lower extremity pain.    Interventions:  2/27/2023: Caudal DEBBIE with 100% relief for 2 weeks, now only mild relief        Objective Pain Scoring:   BRIEF PAIN INVENTORY:  Total score:   Pain Score    04/07/23 0851   PainSc:   8   PainLoc: Leg  Comment: rt leg and back      PHQ-2: PHQ-2 Total Score: 0  PHQ-9: PHQ-9: Brief Depression Severity Measure Score: 0  Opioid Risk Tool:         Review of Systems:   ROS negative except as otherwise noted     Past Medical History:    Past Medical History:   Diagnosis Date   • DDD (degenerative disc disease), lumbar    • Diabetes mellitus    • Hypertension          Past Surgical History:   Past Surgical History:   Procedure Laterality Date   • CYSTOSCOPY W/ LASER LITHOTRIPSY           Family History   Family History   Problem Relation Age of Onset   • Arthritis Mother    • Hyperlipidemia Mother    • Arthritis Father    • Cancer Father    • Diabetes Father    • Heart disease Father    • Hyperlipidemia Father          Social History   Social History     Socioeconomic History   • Marital status:    Tobacco Use   • Smoking status: Every Day     Packs/day: 1.00     Years: 30.00     Pack years: 30.00     Types: Cigarettes     Passive exposure: Current   • Smokeless tobacco: Never   Vaping Use   • Vaping Use: Never used   Substance and Sexual Activity   • Alcohol use: Not Currently   • Drug use: Never   • Sexual activity: Yes     Partners: Female        Medications:     Current Outpatient Medications:   •  gabapentin (Neurontin) 300 MG capsule, Take 1 capsule by mouth 3 (Three) Times a Day. Start with 1 capsule QHS for 7 days, then 1 capsule BID for 7 days, then continue TID., Disp: 90 capsule, Rfl: 1  •  glimepiride (AMARYL) 1 MG tablet, TAKE 1 TABLET BY MOUTH EVERY DAY, Disp: 90 tablet, Rfl: 1  •  ibuprofen (ADVIL,MOTRIN) 800 MG tablet, Take 1 tablet by mouth 2 (Two) Times a Day As Needed for Mild Pain., Disp: , Rfl:   •  Ibuprofen-Famotidine (Duexis) 800-26.6 MG tablet, Take 800 mg by mouth 2 (Two) Times a Day. (Patient not taking: Reported on 4/6/2023), Disp: 180 tablet, Rfl: 1  •  losartan (COZAAR) 100 MG tablet, TAKE 1 TABLET BY MOUTH EVERY DAY, Disp: 90 tablet, Rfl: 0  •  tadalafil (CIALIS) 5 MG tablet, Take 1 tablet by mouth As Needed., Disp: , Rfl:         Physical Exam:     Vitals:    04/07/23 0851   BP: 132/76   BP Location: Left arm   Patient Position: Sitting   Cuff Size: Adult   Pulse: 74   Resp: 14   Temp: 97.4 °F (36.3 °C)  "  TempSrc: Infrared   SpO2: 97%   Weight: 91.5 kg (201 lb 12.8 oz)   Height: 185.4 cm (73\")   PainSc:   8   PainLoc: Leg  Comment: rt leg and back        General: Alert and oriented, No acute distress.   HEENT: Normocephalic, atraumatic.   Cardiovascular: No gross edema  Respiratory: Respirations are non-labored  Thoracic Spine:   Inspection: no gross abnormality  Paraspinal muscle palpation: nontender  Spinous process palpation: nontender    Lumbar Spine:   No masses or atrophy  Range of motion - Flexion normal. Extension normal. Right Lat Bending normal. Left Lat Bending normal  Facet Loading: Positive bilaterally  Facet Palpation - tender bilaterally  PSIS tenderness - Negative bilaterally  Sulaiman's/KIRSTIN/Thigh thrust - Negative bilaterally  Straight leg raise: Positive bilaterally  Slump test: Positive bilaterally    Motor Exam:  Strength: Rate on 1-5 scale Right Left    L1/2- hip flexion 5 5    L3- knee extension 5 5    L4- ankle dorsiflexion 5 5    L5- great toe extension 5 5    S1- ankle plantarflexion 5 5    Sensory Exam: Decreased sensation to light touch bilateral S1 dermatome  Positive Tinel's right fibular head/common peroneal nerve    Neurologic: Cranial Nerves II-XII are grossly intact.   Clonus -negative bilaterally    Psychiatric: Cooperative.   Gait: Normal   Assistive Devices: None    Imaging Studies:   Results for orders placed during the hospital encounter of 11/20/22    MRI Lumbar Spine Without Contrast    Narrative  Examination: MRI LUMBAR SPINE WO CONTRAST-    Date of Exam: 11/20/2022 10:14 AM    Indication: worsening right sided sciatica; M54.41-Lumbago with  sciatica, right side; G89.29-Other chronic pain.    Comparison: None available.    Technique: Standard noncontrast MR pulse sequences of the lumbar spine  were obtained.    Findings:  Five lumbar type vertebral bodies are identified.  Alignment is  anatomic.  There is mild-to-moderate narrowing of the L3-L4 and L4-L5  discs with " desiccation and there is mild narrowing of the L1-L2 disc  with desiccation. There is similar mild narrowing and desiccation at the  T12-L1 disc. The L2-L3 and L5-S1 discs appear within normal limits.  There there is a small chronic Schmorl's node at the inferior L1  endplate. Vertebral bodies maintain normal height.  Distal spinal cord  and conus medullaris appear unremarkable terminating at the T12-L1  level.  Cauda equina appears unremarkable.  There is mild degenerative  bone marrow heterogeneity. No focal bone marrow edema or marrow  replacing process. Paraspinal musculature is preserved.  There is  colonic diverticulosis and there is scarring in the pelvic fat, likely  related to remote diverticulitis.    L1-L2: There is concentric disc bulge and marginal osteophyte formation.  Facet joints appear unremarkable. There is mild bilateral neural  foraminal narrowing.    L2-L3: There is concentric disc bulge and mild facet and ligamentum  flavum hypertrophy. There is mild-to-moderate bilateral neural foraminal  narrowing without spinal canal compromise.    L3-L4: There is concentric disc bulge and extensive marginal osteophyte  formation. There is superimposed right paracentral and left foraminal  disc protrusion. There is moderate right and mild left facet and  ligamentum flavum hypertrophy. There is moderate bilateral neural  foraminal narrowing and moderate narrowing of the spinal canal.    L4-L5: There is concentric disc bulge and marginal osteophyte formation.  There is superimposed right foraminal disc protrusion. The disc contacts  and dorsally displaces the exited right L4 nerve. There is moderate  facet and ligamentum flavum hypertrophy. There is moderate bilateral  neural foraminal narrowing and mild narrowing of the spinal canal with  effacement of the lateral recesses.    L5-S1: There is slight concentric disc bulge and mild right facet  hypertrophy. No neural foraminal or spinal canal  narrowing.    Impression  Moderate lumbar spondylosis with varying degrees of neural foraminal  narrowing and moderate narrowing of the spinal canal at the L3-L4 level.    This report was finalized on 11/20/2022 2:19 PM by Lincoln Kay MD.      Impression & Plan:   1/16/2023: Anshul Fowler is a 57 y.o. male with past medical history significant for DM, HTN who presents to the pain clinic for evaluation and treatment of chronic low back pain with radiation to bilateral lower extremities and feet.  I personally interpreted the patient's lumbar MRI dated 11/20/2022 which demonstrates relatively well-preserved disc height with mild degenerative disc disease at L3/L4 and L4/L5 with posterior disc herniations causing bilateral NFS and lateral recess stenosis at L3/L4, right NFS at L4/L5, mild canal stenosis at L3/L4.  Additionally there is facet hypertrophy and joint effusions at L3/4 and L4/L5.  Clinical examination consistent with chronic axial low back pain secondary to facet degenerative disease with more recent radicular type symptoms.  I also have some suspicion for common peroneal nerve dysfunction.  I will obtain bilateral lower extremity EMG/NCV to assess.  4/7/2023: Excellent but transient benefit from caudal is diagnostic for neuraxial etiology (neurogenic claudication).  Pending EMG/NCV still.  We will trial L4/5 interlaminar epidural closer to the level of his stenosis as well as start gabapentin.    1. Chronic right-sided low back pain with right-sided sciatica    2. Spinal stenosis of lumbar region with neurogenic claudication        PLAN:  1. Medication Recommendations: Continue ibuprofen.  Start gabapentin 300 mg 3 times daily.    2. Physical Therapy: Continue PT    3. Psychological: defer    4. Complementary and alternative (CAM) Therapies:     5. Labs: Consider compliance urinalysis if continuing gabapentin    6. Imaging: None indicated    7. Interventions: Schedule right paramedian L4/5 Interlaminar  Epidural Steroid Injection (CPT 94523).  Consider bilateral L3, L4, L5 diagnostic lumbar medial branch blocks.    8. Referrals: None indicated     9. Records requested: n/a    10. Lifestyle goals:    Follow-up 2 months for med management      [facility]  Nasir Omer MD

## 2023-04-11 DIAGNOSIS — G89.29 CHRONIC RIGHT-SIDED LOW BACK PAIN WITH RIGHT-SIDED SCIATICA: Primary | ICD-10-CM

## 2023-04-11 DIAGNOSIS — M54.41 CHRONIC RIGHT-SIDED LOW BACK PAIN WITH RIGHT-SIDED SCIATICA: Primary | ICD-10-CM

## 2023-04-11 PROBLEM — M54.16 LUMBAR RADICULOPATHY: Status: ACTIVE | Noted: 2023-04-11

## 2023-05-02 ENCOUNTER — OUTSIDE FACILITY SERVICE (OUTPATIENT)
Dept: PAIN MEDICINE | Facility: CLINIC | Age: 58
End: 2023-05-02
Payer: COMMERCIAL

## 2023-05-09 ENCOUNTER — DOCUMENTATION (OUTPATIENT)
Dept: PAIN MEDICINE | Facility: CLINIC | Age: 58
End: 2023-05-09

## 2023-05-09 ENCOUNTER — OUTSIDE FACILITY SERVICE (OUTPATIENT)
Dept: PAIN MEDICINE | Facility: CLINIC | Age: 58
End: 2023-05-09
Payer: COMMERCIAL

## 2023-05-09 NOTE — PROGRESS NOTES
[facility]  [address] [place] [address]        PROCEDURE: Fluoroscopically-guided right paramedian L4/5 lumbar Interlaminar Epidural Steroid Injection     PRE-OP DIAGNOSIS: Lumbar radiculopathy  POST-OP DIAGNOSIS: Same    BLOOD THINNERS (ANTIPLATELETS/ANTICOAGULANTS): Were discussed with the patient and [place] Guidelines were followed.     CONSENT: Risks, benefits and options were explained to the patient, all questions were answered and written informed consent was obtained.     ANESTHESIA: See Anesthesia note    PROCEDURE NOTE: A pre-procedural time out was performed to confirm the correct patient, procedure, side, and site. Standard ASA monitors were applied and oxygen via nasal cannula was provided. All proceduralists donned sterile gloves with masks and surgical hats. The patient was placed prone with pillow under the abdomen and all pressure points padded. The patient's lumbar spine was prepped in standard fashion using [Chlorhexidine] and draped with sterile towels. The target lumbar interspace was identified using fluoroscopy. The overlying skin and subcutaneous tissue was anesthetized with 1% lidocaine. A 20 gauge 10 cm Tuohy needle was advanced using intermittent AP and lateral fluoroscopy. The ligamentum flavum was engaged. Access to the epidural space was gained using a loss of resistance technique to air. Needle placement was confirmed with 1 ml of Omnipaque 180 mgI/ml contrast using biplanar live fluoroscopic imaging. An epidurogram was noted without evidence of intravascular or intrathecal spread. After negative aspiration, a mixture containing 3 ml of preservative-free normal saline, dexamethasone 10mg steroid, lidocaine 1% - 2 ml local anesthetic for a total volume of 6 ml was injected under direct visualization with fluoroscopy. The Tuohy needle was flushed, removed and a bandage applied.     EBL: None     COMPLICATIONS: None     The patient was monitored for  at least 30 minutes prior to discharge. Vital signs remained stable throughout the procedure and in the recovery area. There were no immediate complications and the patient tolerated the procedure well. Sensory and motor exam was unchanged from baseline. The patient received written discharge instructions prior to discharge.     FOLLOW UP: As scheduled     ADDITIONAL NOTES:     [facility]   Nasir Omer MD

## 2023-06-13 DIAGNOSIS — E11.65 TYPE 2 DIABETES MELLITUS WITH HYPERGLYCEMIA, WITHOUT LONG-TERM CURRENT USE OF INSULIN: ICD-10-CM

## 2023-06-13 NOTE — TELEPHONE ENCOUNTER
Caller: Anshul Fowler Jr.    Relationship: Self    Best call back number: [phone]     Requested Prescriptions:   Requested Prescriptions     Pending Prescriptions Disp Refills    glimepiride (AMARYL) 1 MG tablet 90 tablet 1     Sig: Take 1 tablet by mouth Daily.        Pharmacy where request should be sent: [facility]/PHARMACY #[id number] - [place] [address] [address] - [phone]  - [phone] FX     Last office visit with prescribing clinician: 6/20/2022   Last telemedicine visit with prescribing clinician: Visit date not found   Next office visit with prescribing clinician: Visit date not found     Additional details provided by patient: PLEASE REFILL     Does the patient have less than a 3 day supply:  [] Yes  [x] No    Would you like a call back once the refill request has been completed: [] Yes [x] No    If the office needs to give you a call back, can they leave a voicemail: [] Yes [x] No    Nisha Donahue   06/13/23 11:13 EDT

## 2023-06-15 RX ORDER — GLIMEPIRIDE 1 MG/1
1 TABLET ORAL DAILY
Qty: 90 TABLET | Refills: 0 | Status: SHIPPED | OUTPATIENT
Start: 2023-06-15

## 2023-09-01 DIAGNOSIS — E11.65 TYPE 2 DIABETES MELLITUS WITH HYPERGLYCEMIA, WITHOUT LONG-TERM CURRENT USE OF INSULIN: ICD-10-CM

## 2023-09-01 RX ORDER — GLIMEPIRIDE 1 MG/1
TABLET ORAL
Qty: 30 TABLET | Refills: 0 | Status: SHIPPED | OUTPATIENT
Start: 2023-09-01

## 2023-09-24 DIAGNOSIS — E11.65 TYPE 2 DIABETES MELLITUS WITH HYPERGLYCEMIA, WITHOUT LONG-TERM CURRENT USE OF INSULIN: ICD-10-CM

## 2023-09-25 RX ORDER — GLIMEPIRIDE 1 MG/1
TABLET ORAL
Qty: 30 TABLET | Refills: 0 | Status: SHIPPED | OUTPATIENT
Start: 2023-09-25 | End: 2023-10-02 | Stop reason: SDUPTHER

## 2023-09-25 NOTE — TELEPHONE ENCOUNTER
CALLED PT AD LEFT A VOICEMAIL ABOUT NEEDING TO BE SEEN FOR MED REFILLS BECAUSE HE HASN'T BEEN SEEN IN A YEAR. PLEASE SCHEDULE HIM.

## 2023-10-02 ENCOUNTER — OFFICE VISIT (OUTPATIENT)
Dept: FAMILY MEDICINE CLINIC | Facility: CLINIC | Age: 58
End: 2023-10-02
Payer: COMMERCIAL

## 2023-10-02 VITALS
WEIGHT: 206.1 LBS | TEMPERATURE: 98.4 F | HEART RATE: 69 BPM | HEIGHT: 73 IN | SYSTOLIC BLOOD PRESSURE: 146 MMHG | DIASTOLIC BLOOD PRESSURE: 94 MMHG | BODY MASS INDEX: 27.32 KG/M2 | OXYGEN SATURATION: 89 %

## 2023-10-02 DIAGNOSIS — I10 PRIMARY HYPERTENSION: ICD-10-CM

## 2023-10-02 DIAGNOSIS — E78.2 MIXED HYPERLIPIDEMIA: ICD-10-CM

## 2023-10-02 DIAGNOSIS — E11.65 TYPE 2 DIABETES MELLITUS WITH HYPERGLYCEMIA, WITHOUT LONG-TERM CURRENT USE OF INSULIN: ICD-10-CM

## 2023-10-02 DIAGNOSIS — Z72.0 TOBACCO ABUSE: Primary | ICD-10-CM

## 2023-10-02 RX ORDER — GLIMEPIRIDE 1 MG/1
1 TABLET ORAL DAILY
Qty: 90 TABLET | Refills: 1 | Status: SHIPPED | OUTPATIENT
Start: 2023-10-02

## 2023-10-02 RX ORDER — DAPAGLIFLOZIN 10 MG/1
10 TABLET, FILM COATED ORAL DAILY
Qty: 90 TABLET | Refills: 0 | COMMUNITY
Start: 2023-10-02

## 2023-10-02 RX ORDER — LOSARTAN POTASSIUM 100 MG/1
100 TABLET ORAL DAILY
Qty: 90 TABLET | Refills: 1 | Status: SHIPPED | OUTPATIENT
Start: 2023-10-02

## 2023-10-02 NOTE — PROGRESS NOTES
"Chief Complaint  Diabetes (F/u)    Subjective        Anshul Fowler Jr. presents to [facility]  Diabetes   he is not checking  his fsbs. He is complaint with taking his medications. He sees neurology for gabapentin for his diabetic neuropathy.   #2 HTN He is usually better at home.   #3 Current smoker will do low dose CT. Not willing to quit at this time.   #4 Hyperlipidemia, he is statin intolerant.    Objective   Vital Signs:  /94 (BP Location: Left arm, Patient Position: Sitting, Cuff Size: Adult)   Pulse 69   Temp 98.4 °F (36.9 °C) (Temporal)   Ht 185.4 cm (73\")   Wt 93.5 kg (206 lb 1.6 oz)   SpO2 (!) 89%   BMI 27.19 kg/m²   Estimated body mass index is 27.19 kg/m² as calculated from the following:    Height as of this encounter: 185.4 cm (73\").    Weight as of this encounter: 93.5 kg (206 lb 1.6 oz).               Physical Exam  Vitals and nursing note reviewed.   Constitutional:       Appearance: Normal appearance.   HENT:      Head: Normocephalic and atraumatic.      Right Ear: Tympanic membrane and ear canal normal.      Left Ear: Tympanic membrane and ear canal normal.      Nose: Nose normal.      Mouth/Throat:      Pharynx: Oropharynx is clear.   Eyes:      Extraocular Movements: Extraocular movements intact.      Conjunctiva/sclera: Conjunctivae normal.      Pupils: Pupils are equal, round, and reactive to light.   Cardiovascular:      Rate and Rhythm: Normal rate and regular rhythm.      Heart sounds: Normal heart sounds.   Pulmonary:      Effort: Pulmonary effort is normal.      Breath sounds: Normal breath sounds.   Abdominal:      General: Abdomen is flat. Bowel sounds are normal. There is no distension.      Palpations: Abdomen is soft.      Tenderness: There is no abdominal tenderness. There is no guarding or rebound.   Musculoskeletal:         General: Normal range of motion.      Cervical back: Normal range of motion and neck supple.   Skin:     " General: Skin is warm and dry.   Neurological:      General: No focal deficit present.      Mental Status: He is alert and oriented to person, place, and time. Mental status is at baseline.   Psychiatric:         Mood and Affect: Mood normal.         Behavior: Behavior normal.         Thought Content: Thought content normal.         Judgment: Judgment normal.      Result Review :      Data reviewed : reviewed previous blood work             Assessment and Plan   Diagnoses and all orders for this visit:    1. Tobacco abuse (Primary)  Assessment & Plan:  Low dose CT of lungs.    Orders:  -      CT Chest Low Dose Cancer Screening WO; Future    2. Primary hypertension  -     losartan (COZAAR) 100 MG tablet; Take 1 tablet by mouth Daily.  Dispense: 90 tablet; Refill: 1    3. Type 2 diabetes mellitus with hyperglycemia, without long-term current use of insulin  -     glimepiride (AMARYL) 1 MG tablet; Take 1 tablet by mouth Daily.  Dispense: 90 tablet; Refill: 1  -     CBC (No Diff); Future  -     Comprehensive metabolic panel; Future  -     Hemoglobin A1c; Future  -     dapagliflozin Propanediol (Farxiga) 10 MG tablet; Take 10 mg by mouth Daily.  Dispense: 90 tablet; Refill: 0    4. Mixed hyperlipidemia  -     Lipid panel; Future             Follow Up   Return in about 3 months (around 1/2/2024) for Recheck.  Patient was given instructions and counseling regarding his condition or for health maintenance advice. Please see specific information pulled into the AVS if appropriate.       Answers submitted by the patient for this visit:  Primary Reason for Visit (Submitted on 9/30/2023)  What is the primary reason for your visit?: Other  Other (Submitted on 9/30/2023)  Please describe your symptoms.: Annual Visit  Have you had these symptoms before?: No  How long have you been having these symptoms?: Greater than 2 weeks

## 2023-10-03 LAB
ALBUMIN SERPL-MCNC: 4.2 G/DL (ref 3.8–4.9)
ALBUMIN/GLOB SERPL: 1.5 {RATIO} (ref 1.2–2.2)
ALP SERPL-CCNC: 88 IU/L (ref 44–121)
ALT SERPL-CCNC: 11 IU/L (ref 0–44)
AST SERPL-CCNC: 12 IU/L (ref 0–40)
BILIRUB SERPL-MCNC: 0.6 MG/DL (ref 0–1.2)
BUN SERPL-MCNC: 13 MG/DL (ref 6–24)
BUN/CREAT SERPL: 18 (ref 9–20)
CALCIUM SERPL-MCNC: 8.9 MG/DL (ref 8.7–10.2)
CHLORIDE SERPL-SCNC: 99 MMOL/L (ref 96–106)
CHOLEST SERPL-MCNC: 212 MG/DL (ref 100–199)
CO2 SERPL-SCNC: 27 MMOL/L (ref 20–29)
CREAT SERPL-MCNC: 0.74 MG/DL (ref 0.76–1.27)
EGFRCR SERPLBLD CKD-EPI 2021: 106 ML/MIN/1.73
ERYTHROCYTE [DISTWIDTH] IN BLOOD BY AUTOMATED COUNT: 13.5 % (ref 11.6–15.4)
GLOBULIN SER CALC-MCNC: 2.8 G/DL (ref 1.5–4.5)
GLUCOSE SERPL-MCNC: 167 MG/DL (ref 70–99)
HBA1C MFR BLD: 8 % (ref 4.8–5.6)
HCT VFR BLD AUTO: 48.3 % (ref 37.5–51)
HDLC SERPL-MCNC: 39 MG/DL
HGB BLD-MCNC: 16.2 G/DL (ref 13–17.7)
LDLC SERPL CALC-MCNC: 136 MG/DL (ref 0–99)
MCH RBC QN AUTO: 29.3 PG (ref 26.6–33)
MCHC RBC AUTO-ENTMCNC: 33.5 G/DL (ref 31.5–35.7)
MCV RBC AUTO: 87 FL (ref 79–97)
PLATELET # BLD AUTO: 242 X10E3/UL (ref 150–450)
POTASSIUM SERPL-SCNC: 3.5 MMOL/L (ref 3.5–5.2)
PROT SERPL-MCNC: 7 G/DL (ref 6–8.5)
RBC # BLD AUTO: 5.53 X10E6/UL (ref 4.14–5.8)
SODIUM SERPL-SCNC: 140 MMOL/L (ref 134–144)
TRIGL SERPL-MCNC: 206 MG/DL (ref 0–149)
VLDLC SERPL CALC-MCNC: 37 MG/DL (ref 5–40)
WBC # BLD AUTO: 11 X10E3/UL (ref 3.4–10.8)

## 2023-11-10 DIAGNOSIS — E11.65 TYPE 2 DIABETES MELLITUS WITH HYPERGLYCEMIA, WITHOUT LONG-TERM CURRENT USE OF INSULIN: Primary | ICD-10-CM

## 2023-11-10 RX ORDER — DAPAGLIFLOZIN 5 MG/1
5 TABLET, FILM COATED ORAL DAILY
Qty: 90 TABLET | Refills: 1 | Status: SHIPPED | OUTPATIENT
Start: 2023-11-10

## 2023-12-20 ENCOUNTER — OFFICE VISIT (OUTPATIENT)
Dept: FAMILY MEDICINE CLINIC | Facility: CLINIC | Age: 58
End: 2023-12-20
Payer: COMMERCIAL

## 2023-12-20 VITALS
BODY MASS INDEX: 27.46 KG/M2 | DIASTOLIC BLOOD PRESSURE: 104 MMHG | HEIGHT: 73 IN | HEART RATE: 74 BPM | OXYGEN SATURATION: 95 % | WEIGHT: 207.2 LBS | TEMPERATURE: 98.4 F | SYSTOLIC BLOOD PRESSURE: 144 MMHG

## 2023-12-20 DIAGNOSIS — I10 PRIMARY HYPERTENSION: ICD-10-CM

## 2023-12-20 DIAGNOSIS — E11.65 TYPE 2 DIABETES MELLITUS WITH HYPERGLYCEMIA, WITHOUT LONG-TERM CURRENT USE OF INSULIN: Primary | ICD-10-CM

## 2023-12-20 DIAGNOSIS — E78.2 MIXED HYPERLIPIDEMIA: ICD-10-CM

## 2023-12-20 LAB — POC MICROALBUMIN URINE: 0

## 2023-12-20 PROCEDURE — 99214 OFFICE O/P EST MOD 30 MIN: CPT | Performed by: NURSE PRACTITIONER

## 2023-12-20 PROCEDURE — 82044 UR ALBUMIN SEMIQUANTITATIVE: CPT | Performed by: NURSE PRACTITIONER

## 2023-12-20 RX ORDER — LOSARTAN POTASSIUM 100 MG/1
100 TABLET ORAL DAILY
Qty: 90 TABLET | Refills: 1 | Status: SHIPPED | OUTPATIENT
Start: 2023-12-20

## 2023-12-20 RX ORDER — TADALAFIL 5 MG/1
5 TABLET ORAL AS NEEDED
Qty: 90 TABLET | Refills: 1 | Status: SHIPPED | OUTPATIENT
Start: 2023-12-20

## 2023-12-20 RX ORDER — DAPAGLIFLOZIN 5 MG/1
5 TABLET, FILM COATED ORAL DAILY
Qty: 90 TABLET | Refills: 1 | Status: SHIPPED | OUTPATIENT
Start: 2023-12-20

## 2023-12-20 RX ORDER — GLIMEPIRIDE 1 MG/1
1 TABLET ORAL DAILY
Qty: 90 TABLET | Refills: 1 | Status: SHIPPED | OUTPATIENT
Start: 2023-12-20

## 2023-12-20 RX ORDER — IBUPROFEN 800 MG/1
800 TABLET ORAL 2 TIMES DAILY PRN
Qty: 180 TABLET | Refills: 2 | Status: SHIPPED | OUTPATIENT
Start: 2023-12-20

## 2023-12-20 RX ORDER — ICOSAPENT ETHYL 1000 MG/1
2 CAPSULE ORAL 2 TIMES DAILY WITH MEALS
Qty: 360 CAPSULE | Refills: 1 | Status: SHIPPED | OUTPATIENT
Start: 2023-12-20

## 2023-12-20 NOTE — PROGRESS NOTES
"Chief Complaint  Med Refill    Subjective        Anshul Cristi Fowler Jr. presents to [facility]  History of Present Illness He is not taking his fsbs or b/p readings. His last A1c was 8%.   #2 Hyperlipidemia: He has a very serious reaction to statins and is not able to take this.   #3 HTN he is not checking his blood pressures. He has the ability to do this at home. He will bring a diary of these. We discussed importance of doing this. He denies chest pain and shortness of breath.    Objective   Vital Signs:  BP (!) 144/104 (BP Location: Left arm, Patient Position: Sitting, Cuff Size: Adult)   Pulse 74   Temp 98.4 °F (36.9 °C) (Temporal)   Ht 185.4 cm (73\")   Wt 94 kg (207 lb 3.2 oz)   SpO2 95%   BMI 27.34 kg/m²   Estimated body mass index is 27.34 kg/m² as calculated from the following:    Height as of this encounter: 185.4 cm (73\").    Weight as of this encounter: 94 kg (207 lb 3.2 oz).               Physical Exam  Vitals and nursing note reviewed.   Constitutional:       Appearance: Normal appearance.   HENT:      Head: Normocephalic and atraumatic.      Right Ear: Tympanic membrane and ear canal normal.      Left Ear: Tympanic membrane and ear canal normal.      Nose: Nose normal.      Mouth/Throat:      Pharynx: Oropharynx is clear.   Eyes:      Extraocular Movements: Extraocular movements intact.      Conjunctiva/sclera: Conjunctivae normal.      Pupils: Pupils are equal, round, and reactive to light.   Cardiovascular:      Rate and Rhythm: Normal rate and regular rhythm.      Heart sounds: Normal heart sounds.   Pulmonary:      Effort: Pulmonary effort is normal.      Breath sounds: Normal breath sounds.   Abdominal:      General: Abdomen is flat. Bowel sounds are normal. There is no distension.      Palpations: Abdomen is soft.      Tenderness: There is no abdominal tenderness. There is no guarding or rebound.   Musculoskeletal:         General: Normal range of motion.    "   Cervical back: Normal range of motion and neck supple.   Skin:     General: Skin is warm and dry.   Neurological:      General: No focal deficit present.      Mental Status: He is alert and oriented to person, place, and time. Mental status is at baseline.   Psychiatric:         Mood and Affect: Mood normal.         Behavior: Behavior normal.         Thought Content: Thought content normal.         Judgment: Judgment normal.        Result Review :      Data reviewed : reviewed previous blood work.              Assessment and Plan   Diagnoses and all orders for this visit:    1. Type 2 diabetes mellitus with hyperglycemia, without long-term current use of insulin (Primary)  Assessment & Plan:  Diabetes is improving with treatment.   Continue current treatment regimen.  Reminded to bring in blood sugar diary at next visit.  Dietary recommendations for ADA diet.  Regular aerobic exercise.  Discussed foot care.  Reminded to get yearly retinal exam.  Diabetes will be reassessed in 6 months.    Orders:  -     POC Microalbumin  -     glimepiride (AMARYL) 1 MG tablet; Take 1 tablet by mouth Daily.  Dispense: 90 tablet; Refill: 1  -     dapagliflozin (Farxiga) 5 MG tablet tablet; Take 1 tablet by mouth Daily.  Dispense: 90 tablet; Refill: 1  -     Hemoglobin A1c; Future  -     CBC (No Diff); Future  -     Comprehensive metabolic panel; Future  -     Hemoglobin A1c  -     CBC (No Diff)  -     Comprehensive metabolic panel    2. Primary hypertension  Assessment & Plan:  Hypertension is improving with treatment.  Continue current treatment regimen.  Dietary sodium restriction.  Weight loss.  Regular aerobic exercise.  Blood pressure will be reassessed in 3 months.    Orders:  -     losartan (COZAAR) 100 MG tablet; Take 1 tablet by mouth Daily.  Dispense: 90 tablet; Refill: 1    3. Mixed hyperlipidemia  Assessment & Plan:  Lipid abnormalities are newly identified.  Pharmacotherapy as ordered.  Lipids will be reassessed in 3  months. Will add vascepa. He is statin intolerant. Will send to cardiology if this does not  help.    Orders:  -     icosapent ethyl (Vascepa) 1 g capsule capsule; Take 2 g by mouth 2 (Two) Times a Day With Meals.  Dispense: 360 capsule; Refill: 1    Other orders  -     tadalafil (CIALIS) 5 MG tablet; Take 1 tablet by mouth As Needed for Erectile Dysfunction.  Dispense: 90 tablet; Refill: 1  -     ibuprofen (ADVIL,MOTRIN) 800 MG tablet; Take 1 tablet by mouth 2 (Two) Times a Day As Needed for Mild Pain.  Dispense: 180 tablet; Refill: 2             Follow Up   Return in about 3 months (around 3/20/2024) for Recheck.  Patient was given instructions and counseling regarding his condition or for health maintenance advice. Please see specific information pulled into the AVS if appropriate.         Answers submitted by the patient for this visit:  Primary Reason for Visit (Submitted on 12/19/2023)  What is the primary reason for your visit?: Diabetes

## 2023-12-21 LAB
ALBUMIN SERPL-MCNC: 4.2 G/DL (ref 3.8–4.9)
ALBUMIN/GLOB SERPL: 1.4 {RATIO} (ref 1.2–2.2)
ALP SERPL-CCNC: 102 IU/L (ref 44–121)
ALT SERPL-CCNC: 10 IU/L (ref 0–44)
AST SERPL-CCNC: 11 IU/L (ref 0–40)
BILIRUB SERPL-MCNC: 0.6 MG/DL (ref 0–1.2)
BUN SERPL-MCNC: 14 MG/DL (ref 6–24)
BUN/CREAT SERPL: 18 (ref 9–20)
CALCIUM SERPL-MCNC: 9.4 MG/DL (ref 8.7–10.2)
CHLORIDE SERPL-SCNC: 101 MMOL/L (ref 96–106)
CO2 SERPL-SCNC: 21 MMOL/L (ref 20–29)
CREAT SERPL-MCNC: 0.8 MG/DL (ref 0.76–1.27)
EGFRCR SERPLBLD CKD-EPI 2021: 104 ML/MIN/1.73
ERYTHROCYTE [DISTWIDTH] IN BLOOD BY AUTOMATED COUNT: 13.4 % (ref 11.6–15.4)
GLOBULIN SER CALC-MCNC: 2.9 G/DL (ref 1.5–4.5)
GLUCOSE SERPL-MCNC: 161 MG/DL (ref 70–99)
HBA1C MFR BLD: 8.3 % (ref 4.8–5.6)
HCT VFR BLD AUTO: 47.8 % (ref 37.5–51)
HGB BLD-MCNC: 16.7 G/DL (ref 13–17.7)
MCH RBC QN AUTO: 29.5 PG (ref 26.6–33)
MCHC RBC AUTO-ENTMCNC: 34.9 G/DL (ref 31.5–35.7)
MCV RBC AUTO: 84 FL (ref 79–97)
PLATELET # BLD AUTO: 249 X10E3/UL (ref 150–450)
POTASSIUM SERPL-SCNC: 3.9 MMOL/L (ref 3.5–5.2)
PROT SERPL-MCNC: 7.1 G/DL (ref 6–8.5)
RBC # BLD AUTO: 5.67 X10E6/UL (ref 4.14–5.8)
SODIUM SERPL-SCNC: 139 MMOL/L (ref 134–144)
WBC # BLD AUTO: 11 X10E3/UL (ref 3.4–10.8)

## 2023-12-21 NOTE — ASSESSMENT & PLAN NOTE
Lipid abnormalities are newly identified.  Pharmacotherapy as ordered.  Lipids will be reassessed in 3 months. Will add vascepa. He is statin intolerant. Will send to cardiology if this does not  help.

## 2024-02-17 DIAGNOSIS — G89.29 CHRONIC RIGHT-SIDED LOW BACK PAIN WITH RIGHT-SIDED SCIATICA: ICD-10-CM

## 2024-02-17 DIAGNOSIS — M54.41 CHRONIC RIGHT-SIDED LOW BACK PAIN WITH RIGHT-SIDED SCIATICA: ICD-10-CM

## 2024-02-20 ENCOUNTER — TELEPHONE (OUTPATIENT)
Dept: PAIN MEDICINE | Facility: CLINIC | Age: 59
End: 2024-02-20
Payer: COMMERCIAL

## 2024-02-20 NOTE — TELEPHONE ENCOUNTER
Caller: Anshul Fowler Jr.    Relationship: Self    Best call back number:     Requested Prescriptions: gabapentin (Neurontin) 300 MG capsule   Requested Prescriptions      No prescriptions requested or ordered in this encounter        Pharmacy where request should be sent: [facility]/PHARMACY #[id number] - [place], [place] - [address] - [phone]  - [phone] FX     Last office visit with prescribing clinician: 7/7/2023   Last telemedicine visit with prescribing clinician: Visit date not found   Next office visit with prescribing clinician: Visit date not found     Additional details provided by patient: PT STATED HIS PHARMACY DOES NOT HAVE AN RX FOR HIM    Does the patient have less than a 3 day supply:  [x] Yes  [] No    Would you like a call back once the refill request has been completed: [x] Yes [] No    If the office needs to give you a call back, can they leave a voicemail: [x] Yes [] No    Nisha [place] Rep   02/20/24 15:44 EST

## 2024-02-21 RX ORDER — GABAPENTIN 300 MG/1
CAPSULE ORAL
Qty: 60 CAPSULE | OUTPATIENT
Start: 2024-02-21

## 2024-02-21 NOTE — TELEPHONE ENCOUNTER
Hub staff attempted to follow warm transfer process and was unsuccessful     Caller: Anshul Fowler Jr.    Relationship to patient: Self    Best call back number: [phone]    Patient is needing: PATIENT IS WANTING TO CHECK THE STATUS OF THIS PESCRIPTION

## 2024-02-28 ENCOUNTER — OFFICE VISIT (OUTPATIENT)
Dept: PAIN MEDICINE | Facility: CLINIC | Age: 59
End: 2024-02-28
Payer: COMMERCIAL

## 2024-02-28 ENCOUNTER — LAB (OUTPATIENT)
Dept: LAB | Facility: HOSPITAL | Age: 59
End: 2024-02-28
Payer: COMMERCIAL

## 2024-02-28 VITALS
SYSTOLIC BLOOD PRESSURE: 156 MMHG | DIASTOLIC BLOOD PRESSURE: 90 MMHG | HEIGHT: 73 IN | BODY MASS INDEX: 27.17 KG/M2 | TEMPERATURE: 96.8 F | WEIGHT: 205 LBS

## 2024-02-28 DIAGNOSIS — G89.29 CHRONIC RIGHT-SIDED LOW BACK PAIN WITH RIGHT-SIDED SCIATICA: ICD-10-CM

## 2024-02-28 DIAGNOSIS — M48.062 SPINAL STENOSIS OF LUMBAR REGION WITH NEUROGENIC CLAUDICATION: Primary | ICD-10-CM

## 2024-02-28 DIAGNOSIS — Z51.81 ENCOUNTER FOR THERAPEUTIC DRUG LEVEL MONITORING: Primary | ICD-10-CM

## 2024-02-28 DIAGNOSIS — Z51.81 ENCOUNTER FOR THERAPEUTIC DRUG LEVEL MONITORING: ICD-10-CM

## 2024-02-28 DIAGNOSIS — M47.26 OSTEOARTHRITIS OF SPINE WITH RADICULOPATHY, LUMBAR REGION: ICD-10-CM

## 2024-02-28 DIAGNOSIS — M54.41 CHRONIC RIGHT-SIDED LOW BACK PAIN WITH RIGHT-SIDED SCIATICA: ICD-10-CM

## 2024-02-28 LAB
AMPHET+METHAMPHET UR QL: NEGATIVE
AMPHETAMINES UR QL: NEGATIVE
BARBITURATES UR QL SCN: NEGATIVE
BENZODIAZ UR QL SCN: NEGATIVE
BUPRENORPHINE SERPL-MCNC: NEGATIVE NG/ML
CANNABINOIDS SERPL QL: NEGATIVE
COCAINE UR QL: NEGATIVE
FENTANYL UR-MCNC: NEGATIVE NG/ML
METHADONE UR QL SCN: NEGATIVE
OPIATES UR QL: NEGATIVE
OXYCODONE UR QL SCN: NEGATIVE
PCP UR QL SCN: NEGATIVE
TRICYCLICS UR QL SCN: NEGATIVE

## 2024-02-28 PROCEDURE — 80307 DRUG TEST PRSMV CHEM ANLYZR: CPT

## 2024-02-28 NOTE — PROGRESS NOTES
Primary Physician: Tahira Hdez APRN    CHIEF COMPLAINT or REASON FOR VISIT: Back Pain (Spinal stenosis of lumbar region with neurogenic claudication) and Follow-up      Initial HPI 1/16/2023:  Mr. Anshul Fowler is 58 y.o. male who presents as a new patient referral for evaluation treatment of chronic low back pain with radiation to bilateral lower extremities.  Patient states that he has a many decade history of axial low back pain however over the last year he is begun to have radiation into his bilateral lower extremities and the soles of bilateral feet.  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  That subsequently mostly resolved except for left-sided pinky toe numbness tingling and pain.  Patient additionally complains of frequent right lower extremity shooting pain, worst from the right fibular head down to the ankle.  Patient denies any bowel or bladder dysfunction, lower extremity weakness, new onset saddle anesthesia or unexplained weight loss.  He has never had physical therapy, back surgery, back injections.    Interval history: Patient returns to clinic today.  He has previously undergone multiple epidural steroid injections which have not provided him with adequate benefit.  However he has been doing quite well with gabapentin.  At his last office visit his gabapentin was increased from 300 mg to 400 mg however this caused some unwanted side effects such as blurry vision therefore his prescription was changed back to 300 mg twice daily.  Today he complains of chronic low back pain.  He has been out of his gabapentin for a couple of weeks and has noticed a return in his symptoms.  He denies any new injuries or events.  We discussed further interventional treatment options including a repeat caudal injection versus right L4/5 and L5/S1 TFESI.  Patient is interested  in these injections if his pain cannot be managed with medication.  He has an upcoming motorcycle trip with his bike club in July where they are visiting [place], [place].    Interventions:  2/27/23: Caudal DEBBIE with 100% relief for 3 weeks  5/9/23: Right paramedian L4/5 LESI with 0% relief        Objective Pain Scoring:   BRIEF PAIN INVENTORY:  Total score:   Pain Score    02/28/24 1454   PainSc:   7   PainLoc: Back        PHQ-2: PHQ-2 Total Score: 0  PHQ-9: PHQ-9: Brief Depression Severity Measure Score: 0  Opioid Risk Tool:         Review of Systems:   ROS negative except as otherwise noted     Past Medical History:   Past Medical History:   Diagnosis Date    Chronic pain disorder     DDD (degenerative disc disease), lumbar     Diabetes mellitus     Extremity pain     Headache     Due to High Blood Pressure at times    Hypertension     Joint pain     Low back pain     Mixed hyperlipidemia 12/20/2023    Tobacco abuse 10/02/2023         Past Surgical History:   Past Surgical History:   Procedure Laterality Date    CYSTOSCOPY W/ LASER LITHOTRIPSY           Family History   Family History   Problem Relation Age of Onset    Arthritis Mother     Hyperlipidemia Mother     Hypertension Mother     Arthritis Father     Cancer Father     Diabetes Father     Heart disease Father     Hyperlipidemia Father     Depression Father     Hypertension Father          Social History   Social History     Socioeconomic History    Marital status:    Tobacco Use    Smoking status: Every Day     Packs/day: 1.00     Years: 30.00     Additional pack years: 0.00     Total pack years: 30.00     Types: Cigarettes     Passive exposure: Current    Smokeless tobacco: Never   Vaping Use    Vaping Use: Never used   Substance and Sexual Activity    Alcohol use: Not Currently    Drug use: Never    Sexual activity: Yes     Partners: Female     Birth control/protection: None        Medications:     Current Outpatient Medications:      "dapagliflozin (Farxiga) 5 MG tablet tablet, Take 1 tablet by mouth Daily., Disp: 90 tablet, Rfl: 1    gabapentin (Neurontin) 300 MG capsule, Take 1 capsule by mouth 2 (Two) Times a Day. Start with 1 capsule QHS for 7 days, then 1 capsule BID for 7 days, then continue TID., Disp: 60 capsule, Rfl: 5    glimepiride (AMARYL) 1 MG tablet, Take 1 tablet by mouth Daily., Disp: 90 tablet, Rfl: 1    ibuprofen (ADVIL,MOTRIN) 800 MG tablet, Take 1 tablet by mouth 2 (Two) Times a Day As Needed for Mild Pain., Disp: 180 tablet, Rfl: 2    icosapent ethyl (Vascepa) 1 g capsule capsule, Take 2 g by mouth 2 (Two) Times a Day With Meals., Disp: 360 capsule, Rfl: 1    losartan (COZAAR) 100 MG tablet, Take 1 tablet by mouth Daily., Disp: 90 tablet, Rfl: 1    tadalafil (CIALIS) 5 MG tablet, Take 1 tablet by mouth As Needed for Erectile Dysfunction., Disp: 90 tablet, Rfl: 1        Physical Exam:     Vitals:    02/28/24 1454   BP: 156/90   Temp: 96.8 °F (36 °C)   Weight: 93 kg (205 lb)   Height: 185.4 cm (73\")   PainSc:   7   PainLoc: Back          General: Alert and oriented, No acute distress.   HEENT: Normocephalic, atraumatic.   Cardiovascular: No gross edema  Respiratory: Respirations are non-labored  Thoracic Spine:   Inspection: no gross abnormality  Paraspinal muscle palpation: nontender  Spinous process palpation: nontender    Lumbar Spine:   No masses or atrophy  Range of motion - Flexion normal. Extension normal. Right Lat Bending normal. Left Lat Bending normal  Facet Loading: Positive bilaterally  Facet Palpation - tender bilaterally  PSIS tenderness - Negative bilaterally  Sulaiman's/KIRSTIN/Thigh thrust - Negative bilaterally  Straight leg raise: Positive bilaterally  Slump test: Positive bilaterally    Motor Exam:  Strength: Rate on 1-5 scale Right Left    L1/2- hip flexion 5 5    L3- knee extension 5 5    L4- ankle dorsiflexion 5 5    L5- great toe extension 5 5    S1- ankle plantarflexion 5 5    Sensory Exam: Decreased " sensation to light touch bilateral S1 dermatome  Positive Tinel's right fibular head/common peroneal nerve    Neurologic: Cranial Nerves II-XII are grossly intact.   Clonus -negative bilaterally    Psychiatric: Cooperative.   Gait: Normal   Assistive Devices: None    Physical exam is consistent and accurate as of 2/28/2024    Imaging Studies:   Results for orders placed during the hospital encounter of 11/20/22    MRI Lumbar Spine Without Contrast    Narrative  Examination: MRI LUMBAR SPINE WO CONTRAST-    Date of Exam: 11/20/2022 10:14 AM    Indication: worsening right sided sciatica; M54.41-Lumbago with  sciatica, right side; G89.29-Other chronic pain.    Comparison: None available.    Technique: Standard noncontrast MR pulse sequences of the lumbar spine  were obtained.    Findings:  Five lumbar type vertebral bodies are identified.  Alignment is  anatomic.  There is mild-to-moderate narrowing of the L3-L4 and L4-L5  discs with desiccation and there is mild narrowing of the L1-L2 disc  with desiccation. There is similar mild narrowing and desiccation at the  T12-L1 disc. The L2-L3 and L5-S1 discs appear within normal limits.  There there is a small chronic Schmorl's node at the inferior L1  endplate. Vertebral bodies maintain normal height.  Distal spinal cord  and conus medullaris appear unremarkable terminating at the T12-L1  level.  Cauda equina appears unremarkable.  There is mild degenerative  bone marrow heterogeneity. No focal bone marrow edema or marrow  replacing process. Paraspinal musculature is preserved.  There is  colonic diverticulosis and there is scarring in the pelvic fat, likely  related to remote diverticulitis.    L1-L2: There is concentric disc bulge and marginal osteophyte formation.  Facet joints appear unremarkable. There is mild bilateral neural  foraminal narrowing.    L2-L3: There is concentric disc bulge and mild facet and ligamentum  flavum hypertrophy. There is mild-to-moderate  bilateral neural foraminal  narrowing without spinal canal compromise.    L3-L4: There is concentric disc bulge and extensive marginal osteophyte  formation. There is superimposed right paracentral and left foraminal  disc protrusion. There is moderate right and mild left facet and  ligamentum flavum hypertrophy. There is moderate bilateral neural  foraminal narrowing and moderate narrowing of the spinal canal.    L4-L5: There is concentric disc bulge and marginal osteophyte formation.  There is superimposed right foraminal disc protrusion. The disc contacts  and dorsally displaces the exited right L4 nerve. There is moderate  facet and ligamentum flavum hypertrophy. There is moderate bilateral  neural foraminal narrowing and mild narrowing of the spinal canal with  effacement of the lateral recesses.    L5-S1: There is slight concentric disc bulge and mild right facet  hypertrophy. No neural foraminal or spinal canal narrowing.    Impression  Moderate lumbar spondylosis with varying degrees of neural foraminal  narrowing and moderate narrowing of the spinal canal at the L3-L4 level.    This report was finalized on 11/20/2022 2:19 PM by Lincoln Kay MD.      Impression & Plan:   1/16/2023: Anshul Fowler is a 58 y.o. male with past medical history significant for DM, HTN who presents to the pain clinic for evaluation and treatment of chronic low back pain with radiation to bilateral lower extremities and feet.  I personally interpreted the patient's lumbar MRI dated 11/20/2022 which demonstrates relatively well-preserved disc height with mild degenerative disc disease at L3/L4 and L4/L5 with posterior disc herniations causing bilateral NFS and lateral recess stenosis at L3/L4, right NFS at L4/L5, mild canal stenosis at L3/L4.  Additionally there is facet hypertrophy and joint effusions at L3/4 and L4/L5.  Clinical examination consistent with chronic axial low back pain secondary to facet degenerative disease with  more recent radicular type symptoms.  I also have some suspicion for common peroneal nerve dysfunction.  I will obtain bilateral lower extremity EMG/NCV to assess.  4/7/2023: Excellent but transient benefit from caudal is diagnostic for neuraxial etiology (neurogenic claudication).  Pending EMG/NCV still.  We will trial L4/5 interlaminar epidural closer to the level of his stenosis as well as start gabapentin.  7/7/23: Continues to twice daily dosing of gabapentin.  If pain worsens can consider right-sided transforaminal.  2/28/2024: Refill gabapentin.  Can consider right-sided transforaminal versus repeat caudal DEBBIE if pain worsens.     1. Spinal stenosis of lumbar region with neurogenic claudication    2. Chronic right-sided low back pain with right-sided sciatica    3. Osteoarthritis of spine with radiculopathy, lumbar region          PLAN:  1. Medication Recommendations: Continue ibuprofen.    Refill gabapentin 300 mg twice daily, #4 refills  As part of this patient's treatment plan, patient will be prescribed controlled substances.  The patient has been made aware of appropriate use of such medications, including potential risk of somnolent, limited ability to drive and/or work safely, and potential for dependence or overdose.  He has been made clear that his medications refused by this patient only, without concomitant use of alcohol or other substances as prescribed.  Controlled substance status of medication discussed with patient, discussed risk of medication including abuse potential and diversion potential and need to follow-up for reevaluation appointment in order to receive further refills.  Beck was reviewed and compliant.     2. Physical Therapy:     3. Psychological: defer    4. Complementary and alternative (CAM) Therapies:     5. Labs: Order compliance UDS    6. Imaging: None indicated    7. Interventions: Patient can call to schedule right L4-5 and L5/S1 transforaminal epidural steroid injection  (6/4/1941, 21131).  We will limit epidural steroid injections to every 3 to 4 months max.    8. Referrals: None indicated     9. Records requested: n/a    10. Lifestyle goals:    Follow-up 5 months for med management      [facility]  Eliot Alanis PA-C

## 2024-02-28 NOTE — TELEPHONE ENCOUNTER
Refill gabapentin 300 mg, twice daily, #4 refills.   Controlled substance agreement signed.   Compliance UDS ordered.   Beck reviewed and compliant

## 2024-02-29 ENCOUNTER — TELEPHONE (OUTPATIENT)
Dept: PAIN MEDICINE | Facility: CLINIC | Age: 59
End: 2024-02-29
Payer: COMMERCIAL

## 2024-02-29 RX ORDER — GABAPENTIN 300 MG/1
300 CAPSULE ORAL 2 TIMES DAILY
Qty: 60 CAPSULE | Refills: 4 | Status: SHIPPED | OUTPATIENT
Start: 2024-02-29

## 2024-02-29 NOTE — TELEPHONE ENCOUNTER
Patient states his pharmacy told him his insurance will not pay for his gabapentin unless he receives a 90 day suppl, or he uses a mail order pharmacy. He also stated we could just call this change in to the pharmacy. OK to call [facility] and make the change?

## 2024-03-05 NOTE — TELEPHONE ENCOUNTER
Called patient and left VM advising him this office does not do 90 day supplies of controlled substances.

## 2024-03-05 NOTE — TELEPHONE ENCOUNTER
Patient called back, he stated once again that his insurance will not pay for him to fill a 30 day supply of gabapentin at his local pharmacy--that it must be a 90 day supply for his insurance to cover it.     I suggested that the patient ask his pharmacy if he can fill the first 30 day supply locally, and pay out of pocket, while having the rest of his refills sent via mail order (which his insurance will cover). In addition, I suggested that he could perhaps call his PCP to see if they can send a 90 day supply.

## 2024-03-15 ENCOUNTER — OFFICE VISIT (OUTPATIENT)
Dept: FAMILY MEDICINE CLINIC | Facility: CLINIC | Age: 59
End: 2024-03-15
Payer: COMMERCIAL

## 2024-03-15 VITALS
DIASTOLIC BLOOD PRESSURE: 92 MMHG | SYSTOLIC BLOOD PRESSURE: 166 MMHG | OXYGEN SATURATION: 93 % | WEIGHT: 205.3 LBS | HEIGHT: 73 IN | BODY MASS INDEX: 27.21 KG/M2 | HEART RATE: 69 BPM

## 2024-03-15 DIAGNOSIS — M54.41 CHRONIC RIGHT-SIDED LOW BACK PAIN WITH RIGHT-SIDED SCIATICA: Primary | ICD-10-CM

## 2024-03-15 DIAGNOSIS — Z51.81 MEDICATION MONITORING ENCOUNTER: ICD-10-CM

## 2024-03-15 DIAGNOSIS — G89.29 CHRONIC RIGHT-SIDED LOW BACK PAIN WITH RIGHT-SIDED SCIATICA: Primary | ICD-10-CM

## 2024-03-15 RX ORDER — GABAPENTIN 300 MG/1
300 CAPSULE ORAL 2 TIMES DAILY
Qty: 180 CAPSULE | Refills: 2 | Status: SHIPPED | OUTPATIENT
Start: 2024-03-15

## 2024-03-15 NOTE — PROGRESS NOTES
Office Note     Name: Anshul Fowler Jr.    : 1965     MRN: [id number]     Chief Complaint  Med Refill (Pain management told him to come see a dr here so they can write a 90 day prescription where pain management cannot.)    Subjective     History of Present Illness:  Anshul Fowler Jr. is a 58 y.o. male who presents today for:    Chronic right sided low back pain with right sided sciatica  -was taking 300mg twice daily and working well for him  -pain management can only write 30 day scripts but insurance is requiring 90 day scripts in order to cover  -still seeing pain management for injections   -Did have recent drug screen through pain management office that was appropriate    Past Medical History:   Past Medical History:   Diagnosis Date    Cholelithiasis     Chronic pain disorder     DDD (degenerative disc disease), lumbar     Diabetes mellitus     Extremity pain     Headache     Due to High Blood Pressure at times    Hypertension     Joint pain     Kidney stone     Low back pain     Mixed hyperlipidemia 2023    Neuromuscular disorder     Tobacco abuse 10/02/2023       Past Surgical History:   Past Surgical History:   Procedure Laterality Date    COLONOSCOPY      CYSTOSCOPY W/ LASER LITHOTRIPSY         Immunizations:   Immunization History   Administered Date(s) Administered    COVID-19 (PFIZER) Purple Cap Monovalent 2022    Fluzone (or Fluarix & Flulaval for VFC) >6mos 10/14/2022    Fluzone Quad >6mos (Multi-dose) 2019    Influenza Quad Vaccine (Inpatient) 2010    Influenza, Unspecified 2019        Medications:     Current Outpatient Medications:     dapagliflozin (Farxiga) 5 MG tablet tablet, Take 1 tablet by mouth Daily., Disp: 90 tablet, Rfl: 1    glimepiride (AMARYL) 1 MG tablet, Take 1 tablet by mouth Daily., Disp: 90 tablet, Rfl: 1    ibuprofen (ADVIL,MOTRIN) 800 MG tablet, Take 1 tablet by mouth 2 (Two) Times a Day As Needed for Mild Pain., Disp: 180  "tablet, Rfl: 2    icosapent ethyl (Vascepa) 1 g capsule capsule, Take 2 g by mouth 2 (Two) Times a Day With Meals., Disp: 360 capsule, Rfl: 1    losartan (COZAAR) 100 MG tablet, Take 1 tablet by mouth Daily., Disp: 90 tablet, Rfl: 1    tadalafil (CIALIS) 5 MG tablet, Take 1 tablet by mouth As Needed for Erectile Dysfunction., Disp: 90 tablet, Rfl: 1    gabapentin (NEURONTIN) 300 MG capsule, Take 1 capsule by mouth 2 (Two) Times a Day., Disp: 180 capsule, Rfl: 2    Allergies:   Allergies   Allergen Reactions    Penicillins Rash       Family History:   Family History   Problem Relation Age of Onset    Arthritis Mother     Hyperlipidemia Mother     Hypertension Mother     Arthritis Father     Cancer Father     Diabetes Father     Heart disease Father     Hyperlipidemia Father     Depression Father     Hypertension Father        Social History:   Social History     Socioeconomic History    Marital status:    Tobacco Use    Smoking status: Every Day     Current packs/day: 1.00     Average packs/day: 1 pack/day for 30.0 years (30.0 ttl pk-yrs)     Types: Cigarettes     Passive exposure: Current    Smokeless tobacco: Never   Vaping Use    Vaping status: Never Used   Substance and Sexual Activity    Alcohol use: Not Currently    Drug use: Never    Sexual activity: Yes     Partners: Female     Birth control/protection: None         Objective     Vital Signs  /92 (BP Location: Right arm, Patient Position: Sitting, Cuff Size: Large Adult)   Pulse 69   Ht 185.4 cm (72.99\")   Wt 93.1 kg (205 lb 4.8 oz)   SpO2 93%   BMI 27.09 kg/m²   Estimated body mass index is 27.09 kg/m² as calculated from the following:    Height as of this encounter: 185.4 cm (72.99\").    Weight as of this encounter: 93.1 kg (205 lb 4.8 oz).      Physical Exam  Constitutional:       General: He is not in acute distress.     Appearance: Normal appearance.   HENT:      Head: Normocephalic and atraumatic.   Eyes:      Conjunctiva/sclera: " Conjunctivae normal.   Cardiovascular:      Rate and Rhythm: Normal rate and regular rhythm.   Pulmonary:      Effort: Pulmonary effort is normal.      Breath sounds: Normal breath sounds.   Neurological:      Mental Status: He is alert.   Psychiatric:         Mood and Affect: Mood normal.         Behavior: Behavior normal.         Thought Content: Thought content normal.          Assessment and Plan     Diagnoses and all orders for this visit:    1. Chronic right-sided low back pain with right-sided sciatica (Primary)  -     gabapentin (NEURONTIN) 300 MG capsule; Take 1 capsule by mouth 2 (Two) Times a Day.  Dispense: 180 capsule; Refill: 2    2. Medication monitoring encounter  -     Cancel: POC Urine Drug Screen, Triage    Patient reports today to discuss his chronic right-sided low back pain with right-sided sciatica.  He has been following with pain management but their office is only able to distribute 30-day prescription at a time and his new insurance requires 90-day scripts of any maintenance medication and gabapentin falls under that category for him.  He did have a recent drug screen through pain management office that I was able to review and was appropriate as we will not repeat that today.  He did sign a controlled substance agreement with our office and prescription was sent for him.  PDMP reviewed.  Follow-up in 3 months.         Follow Up  Return in about 3 months (around 6/15/2024) for Next scheduled follow up.    DO YASMEEN Camarena [facility]  [address] [place] [address] [id number] [phone]    NOTE TO PATIENT: The 21st Century Cures Act makes medical notes like these available to patients in the interest of transparency. However, be advised this is a medical document. It is intended as peer to peer communication. It is written in medical language and may contain abbreviations or verbiage that are unfamiliar. It may appear blunt or direct.  Medical documents are intended to carry relevant information, facts as evident, and the clinical opinion of the practitioner.

## 2024-05-08 ENCOUNTER — TELEPHONE (OUTPATIENT)
Dept: FAMILY MEDICINE CLINIC | Facility: CLINIC | Age: 59
End: 2024-05-08
Payer: COMMERCIAL

## 2024-05-29 ENCOUNTER — OFFICE VISIT (OUTPATIENT)
Dept: FAMILY MEDICINE CLINIC | Facility: CLINIC | Age: 59
End: 2024-05-29
Payer: COMMERCIAL

## 2024-05-29 VITALS
HEART RATE: 67 BPM | SYSTOLIC BLOOD PRESSURE: 138 MMHG | WEIGHT: 200.9 LBS | DIASTOLIC BLOOD PRESSURE: 88 MMHG | BODY MASS INDEX: 26.63 KG/M2 | OXYGEN SATURATION: 95 % | HEIGHT: 73 IN

## 2024-05-29 DIAGNOSIS — E87.6 HYPOKALEMIA: ICD-10-CM

## 2024-05-29 DIAGNOSIS — R74.01 TRANSAMINITIS: Primary | ICD-10-CM

## 2024-05-29 PROCEDURE — 36415 COLL VENOUS BLD VENIPUNCTURE: CPT | Performed by: STUDENT IN AN ORGANIZED HEALTH CARE EDUCATION/TRAINING PROGRAM

## 2024-05-29 PROCEDURE — 99214 OFFICE O/P EST MOD 30 MIN: CPT | Performed by: STUDENT IN AN ORGANIZED HEALTH CARE EDUCATION/TRAINING PROGRAM

## 2024-05-29 NOTE — PROGRESS NOTES
Office Note     Name: Anshul Fowler Jr.    : 1965     MRN: [id number]     Chief Complaint  Follow-up (Lab results)    Subjective     History of Present Illness:  Anshul Fowler Jr. is a 58 y.o. male who presents today for lab follow up    Transaminitis  -Recent blood work showed alkaline phosphatase of 203, AST of 42, ALT of 163 which is a significant change compared to 2023  -the Monday before labs () did have prime rib and had belly pain after with one episode of vomiting. Did have heartburn. The next day had fever and chills. Skin looked more yellow then. Eyes looked yellow. Had a fever of 104 checked by the school nurse.   -Tried to be seen here but no appt available but then went to urgent care and was evaluated. He reports they tested him for covid but unsure what else.    -Developed dark urine and reyna colored stools. Per his report his wife called Tahira and she ordered labs  and he made this follow up appt  -All symptoms have now resolved and this was 3 weeks ago    Hypokalemia  -noted on last blood work, has not had this issue before     Past Medical History:   Past Medical History:   Diagnosis Date    Cholelithiasis     Chronic pain disorder     DDD (degenerative disc disease), lumbar     Diabetes mellitus     Extremity pain     Headache     Due to High Blood Pressure at times    Hypertension     Joint pain     Kidney stone     Low back pain     Mixed hyperlipidemia 2023    Neuromuscular disorder     Tobacco abuse 10/02/2023       Past Surgical History:   Past Surgical History:   Procedure Laterality Date    COLONOSCOPY      CYSTOSCOPY W/ LASER LITHOTRIPSY         Immunizations:   Immunization History   Administered Date(s) Administered    COVID-19 (PFIZER) Purple Cap Monovalent 2022    Fluzone (or Fluarix & Flulaval for VFC) >6mos 10/14/2022    Fluzone Quad >6mos (Multi-dose) 2019    Influenza Quad Vaccine (Inpatient) 2010    Influenza,  I gave her disability paperwork to Dr Leanne Macias after her office visit  "Unspecified 01/02/2019        Medications:     Current Outpatient Medications:     dapagliflozin (Farxiga) 5 MG tablet tablet, Take 1 tablet by mouth Daily., Disp: 90 tablet, Rfl: 1    gabapentin (NEURONTIN) 300 MG capsule, Take 1 capsule by mouth 2 (Two) Times a Day., Disp: 180 capsule, Rfl: 2    glimepiride (AMARYL) 1 MG tablet, Take 1 tablet by mouth Daily., Disp: 90 tablet, Rfl: 1    ibuprofen (ADVIL,MOTRIN) 800 MG tablet, Take 1 tablet by mouth 2 (Two) Times a Day As Needed for Mild Pain., Disp: 180 tablet, Rfl: 2    losartan (COZAAR) 100 MG tablet, Take 1 tablet by mouth Daily., Disp: 90 tablet, Rfl: 1    tadalafil (CIALIS) 5 MG tablet, Take 1 tablet by mouth As Needed for Erectile Dysfunction., Disp: 90 tablet, Rfl: 1    Allergies:   Allergies   Allergen Reactions    Penicillins Rash       Family History:   Family History   Problem Relation Age of Onset    Arthritis Mother     Hyperlipidemia Mother     Hypertension Mother     Arthritis Father     Cancer Father     Diabetes Father     Heart disease Father     Hyperlipidemia Father     Depression Father     Hypertension Father        Social History:   Social History     Socioeconomic History    Marital status:    Tobacco Use    Smoking status: Every Day     Current packs/day: 1.00     Average packs/day: 1 pack/day for 30.0 years (30.0 ttl pk-yrs)     Types: Cigarettes     Passive exposure: Current    Smokeless tobacco: Never   Vaping Use    Vaping status: Never Used   Substance and Sexual Activity    Alcohol use: Not Currently    Drug use: Never    Sexual activity: Yes     Partners: Female     Birth control/protection: None         Objective     Vital Signs  /88 (BP Location: Left arm, Patient Position: Sitting, Cuff Size: Adult)   Pulse 67   Ht 185.4 cm (72.99\")   Wt 91.1 kg (200 lb 14.4 oz)   SpO2 95%   BMI 26.51 kg/m²   Estimated body mass index is 26.51 kg/m² as calculated from the following:    Height as of this encounter: 185.4 cm " "(72.99\").    Weight as of this encounter: 91.1 kg (200 lb 14.4 oz).      Physical Exam  Constitutional:       General: He is not in acute distress.     Appearance: Normal appearance.   HENT:      Head: Normocephalic and atraumatic.   Eyes:      General: No scleral icterus.     Conjunctiva/sclera: Conjunctivae normal.   Cardiovascular:      Rate and Rhythm: Normal rate and regular rhythm.   Pulmonary:      Effort: Pulmonary effort is normal.      Breath sounds: Normal breath sounds.   Abdominal:      General: Abdomen is flat. Bowel sounds are normal. There is no distension.      Palpations: Abdomen is soft.      Tenderness: There is no abdominal tenderness. There is no guarding or rebound.   Neurological:      Mental Status: He is alert.   Psychiatric:         Mood and Affect: Mood normal.         Behavior: Behavior normal.         Thought Content: Thought content normal.          Assessment and Plan     Diagnoses and all orders for this visit:    1. Transaminitis (Primary)  -     Comprehensive metabolic panel; Future  -     Hepatitis C Antibody; Future  -     Hepatitis A and B Profile; Future  -     Comprehensive metabolic panel  -     Hepatitis C Antibody  -     Hepatitis A and B Profile    2. Hypokalemia  -     Comprehensive metabolic panel; Future  -     Comprehensive metabolic panel    Patient presents today with symptoms concerning for some sort of biliary obstruction or hepatitis.  This was several weeks ago and symptoms have now resolved.  He had labs done at an outside facility 5/9/2024 and has not been followed up since then.  Did have significant transaminitis at that time.  He was also found to have hypokalemia.  His symptoms are resolved and physical exam is completely normal today.  Will obtain a hepatitis panel and recheck liver enzymes as well as potassium level.  If any abnormality in liver enzymes we will move forward with imaging.  He was agreeable to this plan.  He was counseled extensively if he " has jaundice, reyna colored stools, dark urine again he should go to the emergency room to be evaluated immediately.  He expressed understanding.         Follow Up  Based on labs    DO YASMEEN Camarena [facility]  [address] [id number] [phone]    NOTE TO PATIENT: The 21st Century Cures Act makes medical notes like these available to patients in the interest of transparency. However, be advised this is a medical document. It is intended as peer to peer communication. It is written in medical language and may contain abbreviations or verbiage that are unfamiliar. It may appear blunt or direct. Medical documents are intended to carry relevant information, facts as evident, and the clinical opinion of the practitioner.

## 2024-05-30 ENCOUNTER — TELEPHONE (OUTPATIENT)
Dept: FAMILY MEDICINE CLINIC | Facility: CLINIC | Age: 59
End: 2024-05-30
Payer: COMMERCIAL

## 2024-05-30 LAB
ALBUMIN SERPL-MCNC: 4.1 G/DL (ref 3.8–4.9)
ALBUMIN/GLOB SERPL: 1.5 {RATIO} (ref 1.2–2.2)
ALP SERPL-CCNC: 98 IU/L (ref 44–121)
ALT SERPL-CCNC: 15 IU/L (ref 0–44)
AST SERPL-CCNC: 14 IU/L (ref 0–40)
BILIRUB SERPL-MCNC: 1 MG/DL (ref 0–1.2)
BUN SERPL-MCNC: 13 MG/DL (ref 6–24)
BUN/CREAT SERPL: 17 (ref 9–20)
CALCIUM SERPL-MCNC: 9.2 MG/DL (ref 8.7–10.2)
CHLORIDE SERPL-SCNC: 103 MMOL/L (ref 96–106)
CO2 SERPL-SCNC: 23 MMOL/L (ref 20–29)
CREAT SERPL-MCNC: 0.77 MG/DL (ref 0.76–1.27)
EGFRCR SERPLBLD CKD-EPI 2021: 105 ML/MIN/1.73
GLOBULIN SER CALC-MCNC: 2.8 G/DL (ref 1.5–4.5)
GLUCOSE SERPL-MCNC: 157 MG/DL (ref 70–99)
HAV AB SER QL IA: NEGATIVE
HAV IGM SERPL QL IA: NEGATIVE
HBV CORE AB SERPL QL IA: NEGATIVE
HBV CORE IGM SERPL QL IA: NEGATIVE
HBV SURFACE AB SER QL: NON REACTIVE
HBV SURFACE AG SERPL QL IA: NEGATIVE
HCV IGG SERPL QL IA: NON REACTIVE
POTASSIUM SERPL-SCNC: 3.8 MMOL/L (ref 3.5–5.2)
PROT SERPL-MCNC: 6.9 G/DL (ref 6–8.5)
SODIUM SERPL-SCNC: 140 MMOL/L (ref 134–144)

## 2024-05-30 NOTE — TELEPHONE ENCOUNTER
On 5/14/2024 Mr. Fowler requested that I order labwork for him. He had been experiencing abdominal pain and woke up jaundiced. I highly recommend that he go to the ER but he refused. I ordered blood work to be done on that day at [facility] in [place], [place]. He had elevated LFT's. I called him on 5/16/2024 to report these results and again recommended that he go to the ER but he states that he was feeling better and jaundice had resolved. I then recommend that he f/u with a new PCP  ASAP since I am not seeing family practice anymore. He states that he will.   STANISLAW Goldsmith

## 2024-06-24 DIAGNOSIS — E11.65 TYPE 2 DIABETES MELLITUS WITH HYPERGLYCEMIA, WITHOUT LONG-TERM CURRENT USE OF INSULIN: ICD-10-CM

## 2024-06-25 RX ORDER — DAPAGLIFLOZIN 5 MG/1
5 TABLET, FILM COATED ORAL DAILY
Qty: 90 TABLET | Refills: 0 | Status: SHIPPED | OUTPATIENT
Start: 2024-06-25

## 2024-06-25 RX ORDER — GLIMEPIRIDE 1 MG/1
1 TABLET ORAL DAILY
Qty: 90 TABLET | Refills: 0 | Status: SHIPPED | OUTPATIENT
Start: 2024-06-25

## 2024-06-25 RX ORDER — IBUPROFEN 800 MG/1
800 TABLET ORAL 2 TIMES DAILY PRN
Qty: 180 TABLET | Refills: 0 | Status: SHIPPED | OUTPATIENT
Start: 2024-06-25

## 2024-06-25 NOTE — TELEPHONE ENCOUNTER
Rx Refill Note    Requested Prescriptions     Pending Prescriptions Disp Refills    Farxiga 5 MG tablet tablet [Pharmacy Med Name: FARXIGA 5 MG TABLET] 90 tablet 0     Sig: TAKE 1 TABLET BY MOUTH EVERY DAY    ibuprofen (ADVIL,MOTRIN) 800 MG tablet [Pharmacy Med Name: IBUPROFEN 800 MG TABLET] 180 tablet 0     Sig: TAKE 1 TABLET BY MOUTH 2 (TWO) TIMES A DAY AS NEEDED FOR MILD PAIN.    glimepiride (AMARYL) 1 MG tablet [Pharmacy Med Name: GLIMEPIRIDE 1 MG TABLET] 90 tablet 0     Sig: TAKE 1 TABLET BY MOUTH EVERY DAY        Last office visit with prescribing clinician: 12/20/2023      Next office visit with prescribing clinician: Visit date not found   Last labs:   Last refill: 12/20/2023 by Howard    Pharmacy (be sure to add in Epic). correct

## 2024-07-01 ENCOUNTER — TELEPHONE (OUTPATIENT)
Dept: FAMILY MEDICINE CLINIC | Facility: CLINIC | Age: 59
End: 2024-07-01
Payer: COMMERCIAL

## 2024-07-01 NOTE — TELEPHONE ENCOUNTER
Called pharmacy to see if they would b able to refill his med early due to him being ut [facility], the pharmacy is agreeable to this.

## 2024-07-22 ENCOUNTER — OFFICE VISIT (OUTPATIENT)
Dept: PAIN MEDICINE | Facility: CLINIC | Age: 59
End: 2024-07-22
Payer: COMMERCIAL

## 2024-07-22 VITALS — HEIGHT: 73 IN | BODY MASS INDEX: 26.33 KG/M2 | WEIGHT: 198.7 LBS

## 2024-07-22 DIAGNOSIS — M48.062 SPINAL STENOSIS OF LUMBAR REGION WITH NEUROGENIC CLAUDICATION: Primary | ICD-10-CM

## 2024-07-22 DIAGNOSIS — M47.26 OSTEOARTHRITIS OF SPINE WITH RADICULOPATHY, LUMBAR REGION: ICD-10-CM

## 2024-07-22 PROCEDURE — 99213 OFFICE O/P EST LOW 20 MIN: CPT

## 2024-07-22 NOTE — PROGRESS NOTES
Primary Physician: Kamille Goldberg DO    CHIEF COMPLAINT or REASON FOR VISIT: Follow-up, Back Pain (Patient reports that pain is getting worse, radiating to the left side. ), and Med Management      Initial HPI 1/16/2023:  Mr. Anshul Fowler is 58 y.o. male who presents as a new patient referral for evaluation treatment of chronic low back pain with radiation to bilateral lower extremities.  Patient states that he has a many decade history of axial low back pain however over the last year he is begun to have radiation into his bilateral lower extremities and the soles of bilateral feet.  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  That subsequently mostly resolved except for left-sided pinky toe numbness tingling and pain.  Patient additionally complains of frequent right lower extremity shooting pain, worst from the right fibular head down to the ankle.  Patient denies any bowel or bladder dysfunction, lower extremity weakness, new onset saddle anesthesia or unexplained weight loss.  He has never had physical therapy, back surgery, back injections.    Interval history: Patient returns to clinic today.  He has previously been taking gabapentin with good benefit.  This was recently prescribed by us but has now been taken over by his PCP.  He continues to take gabapentin 300 mg twice daily with good benefit.  He has noticed his symptom of gotten a little worse over the last couple weeks.  Today he complains of chronic low back pain with radiation to the bilateral lower extremities.  His leg pain appears to be the most bothersome aspect.  Pain will radiate along the anterior and posterior thighs and even occasionally into the feet.  Gabapentin provides good benefit but he is looking to get further pain relief.  He has previously undergone a caudal epidural steroid injection with  excellent benefit.  Subsequent interlaminar epidural steroid injection did not provide any relief.  Patient did recently turn from motorcycle trip with his bike club.  Pain is exacerbated with standing and walking.  It is alleviated with sitting    Interventions:  2/27/23: Caudal DEBBIE with 100% relief for 3 weeks  5/9/23: Right paramedian L4/5 LESI with 0% relief        Objective Pain Scoring:   BRIEF PAIN INVENTORY:  Total score:   Pain Score    07/22/24 1256   PainSc:   5   PainLoc: Back          PHQ-2:    PHQ-9:    Opioid Risk Tool:         Review of Systems:   ROS negative except as otherwise noted     Past Medical History:   Past Medical History:   Diagnosis Date    Cholelithiasis     Chronic pain disorder     DDD (degenerative disc disease), lumbar     Diabetes mellitus     Extremity pain     Headache     Due to High Blood Pressure at times    Hypertension     Joint pain     Kidney stone     Low back pain     Mixed hyperlipidemia 12/20/2023    Neuromuscular disorder     Peripheral neuropathy     Tobacco abuse 10/02/2023         Past Surgical History:   Past Surgical History:   Procedure Laterality Date    COLONOSCOPY      CYSTOSCOPY W/ LASER LITHOTRIPSY           Family History   Family History   Problem Relation Age of Onset    Arthritis Mother     Hyperlipidemia Mother     Hypertension Mother     Arthritis Father     Cancer Father     Diabetes Father     Heart disease Father     Hyperlipidemia Father     Depression Father     Hypertension Father          Social History   Social History     Socioeconomic History    Marital status:    Tobacco Use    Smoking status: Every Day     Current packs/day: 1.00     Average packs/day: 1 pack/day for 30.0 years (30.0 ttl pk-yrs)     Types: Cigarettes     Passive exposure: Current    Smokeless tobacco: Never   Vaping Use    Vaping status: Never Used   Substance and Sexual Activity    Alcohol use: Not Currently    Drug use: Never    Sexual activity: Yes     Partners:  "Female     Birth control/protection: None        Medications:     Current Outpatient Medications:     Farxiga 5 MG tablet tablet, TAKE 1 TABLET BY MOUTH EVERY DAY, Disp: 90 tablet, Rfl: 0    gabapentin (NEURONTIN) 300 MG capsule, Take 1 capsule by mouth 2 (Two) Times a Day., Disp: 180 capsule, Rfl: 2    glimepiride (AMARYL) 1 MG tablet, TAKE 1 TABLET BY MOUTH EVERY DAY, Disp: 90 tablet, Rfl: 0    ibuprofen (ADVIL,MOTRIN) 800 MG tablet, TAKE 1 TABLET BY MOUTH 2 (TWO) TIMES A DAY AS NEEDED FOR MILD PAIN., Disp: 180 tablet, Rfl: 0    losartan (COZAAR) 100 MG tablet, Take 1 tablet by mouth Daily., Disp: 90 tablet, Rfl: 1    tadalafil (CIALIS) 5 MG tablet, Take 1 tablet by mouth As Needed for Erectile Dysfunction., Disp: 90 tablet, Rfl: 1        Physical Exam:     Vitals:    07/22/24 1256   Weight: 90.1 kg (198 lb 11.2 oz)   Height: 185.4 cm (72.99\")   PainSc:   5   PainLoc: Back            General: Alert and oriented, No acute distress.   HEENT: Normocephalic, atraumatic.   Cardiovascular: No gross edema  Respiratory: Respirations are non-labored  Thoracic Spine:   Inspection: no gross abnormality  Paraspinal muscle palpation: nontender  Spinous process palpation: nontender    Lumbar Spine:   No masses or atrophy  Range of motion - Flexion normal. Extension normal. Right Lat Bending normal. Left Lat Bending normal  Facet Loading: Positive bilaterally  Facet Palpation - tender bilaterally  PSIS tenderness - Negative bilaterally  Sulaiman's/KIRSTIN/Thigh thrust - Negative bilaterally  Straight leg raise: Positive bilaterally  Slump test: Positive bilaterally    Motor Exam:  Strength: Rate on 1-5 scale Right Left    L1/2- hip flexion 5 5    L3- knee extension 5 5    L4- ankle dorsiflexion 5 5    L5- great toe extension 5 5    S1- ankle plantarflexion 5 5    Sensory Exam: Decreased sensation to light touch bilateral S1 dermatome  Positive Tinel's right fibular head/common peroneal nerve    Neurologic: Cranial Nerves II-XII are " grossly intact.   Clonus -negative bilaterally    Psychiatric: Cooperative.   Gait: Normal   Assistive Devices: None      Imaging Studies:   Results for orders placed during the hospital encounter of 11/20/22    MRI Lumbar Spine Without Contrast    Narrative  Examination: MRI LUMBAR SPINE WO CONTRAST-    Date of Exam: 11/20/2022 10:14 AM    Indication: worsening right sided sciatica; M54.41-Lumbago with  sciatica, right side; G89.29-Other chronic pain.    Comparison: None available.    Technique: Standard noncontrast MR pulse sequences of the lumbar spine  were obtained.    Findings:  Five lumbar type vertebral bodies are identified.  Alignment is  anatomic.  There is mild-to-moderate narrowing of the L3-L4 and L4-L5  discs with desiccation and there is mild narrowing of the L1-L2 disc  with desiccation. There is similar mild narrowing and desiccation at the  T12-L1 disc. The L2-L3 and L5-S1 discs appear within normal limits.  There there is a small chronic Schmorl's node at the inferior L1  endplate. Vertebral bodies maintain normal height.  Distal spinal cord  and conus medullaris appear unremarkable terminating at the T12-L1  level.  Cauda equina appears unremarkable.  There is mild degenerative  bone marrow heterogeneity. No focal bone marrow edema or marrow  replacing process. Paraspinal musculature is preserved.  There is  colonic diverticulosis and there is scarring in the pelvic fat, likely  related to remote diverticulitis.    L1-L2: There is concentric disc bulge and marginal osteophyte formation.  Facet joints appear unremarkable. There is mild bilateral neural  foraminal narrowing.    L2-L3: There is concentric disc bulge and mild facet and ligamentum  flavum hypertrophy. There is mild-to-moderate bilateral neural foraminal  narrowing without spinal canal compromise.    L3-L4: There is concentric disc bulge and extensive marginal osteophyte  formation. There is superimposed right paracentral and left  foraminal  disc protrusion. There is moderate right and mild left facet and  ligamentum flavum hypertrophy. There is moderate bilateral neural  foraminal narrowing and moderate narrowing of the spinal canal.    L4-L5: There is concentric disc bulge and marginal osteophyte formation.  There is superimposed right foraminal disc protrusion. The disc contacts  and dorsally displaces the exited right L4 nerve. There is moderate  facet and ligamentum flavum hypertrophy. There is moderate bilateral  neural foraminal narrowing and mild narrowing of the spinal canal with  effacement of the lateral recesses.    L5-S1: There is slight concentric disc bulge and mild right facet  hypertrophy. No neural foraminal or spinal canal narrowing.    Impression  Moderate lumbar spondylosis with varying degrees of neural foraminal  narrowing and moderate narrowing of the spinal canal at the L3-L4 level.    This report was finalized on 11/20/2022 2:19 PM by Lincoln Kay MD.      Impression & Plan:   1/16/2023: Anshul Fowler is a 58 y.o. male with past medical history significant for DM, HTN who presents to the pain clinic for evaluation and treatment of chronic low back pain with radiation to bilateral lower extremities and feet.  I personally interpreted the patient's lumbar MRI dated 11/20/2022 which demonstrates relatively well-preserved disc height with mild degenerative disc disease at L3/L4 and L4/L5 with posterior disc herniations causing bilateral NFS and lateral recess stenosis at L3/L4, right NFS at L4/L5, mild canal stenosis at L3/L4.  Additionally there is facet hypertrophy and joint effusions at L3/4 and L4/L5.  Clinical examination consistent with chronic axial low back pain secondary to facet degenerative disease with more recent radicular type symptoms.  I also have some suspicion for common peroneal nerve dysfunction.  I will obtain bilateral lower extremity EMG/NCV to assess.  4/7/2023: Excellent but transient benefit  from caudal is diagnostic for neuraxial etiology (neurogenic claudication).  Pending EMG/NCV still.  We will trial L4/5 interlaminar epidural closer to the level of his stenosis as well as start gabapentin.  7/7/23: Continues to twice daily dosing of gabapentin.  If pain worsens can consider right-sided transforaminal.  2/28/2024: Refill gabapentin.  Can consider right-sided transforaminal versus repeat caudal DEBBIE if pain worsens.   7/22/2024: Good relief with gabapentin.  PCP currently prescribing.  Will plan for bilateral L3/4 and L4/5 TFESI.    1. Spinal stenosis of lumbar region with neurogenic claudication    2. Osteoarthritis of spine with radiculopathy, lumbar region            PLAN:  1. Medication Recommendations: Continue ibuprofen.    -Agree with gabapentin  2. Physical Therapy:     3. Psychological: defer    4. Complementary and alternative (CAM) Therapies:     5. Labs:     6. Imaging: None indicated    7. Interventions: Schedule bilateral L3/4 and L4/5 transforaminal epidural steroid injections ([id number], [id number]).     8. Referrals: None indicated     9. Records requested: n/a    10. Lifestyle goals:    Follow-up 6 weeks after injection      [facility]  Eliot Alanis PA-C

## 2024-08-06 ENCOUNTER — TELEPHONE (OUTPATIENT)
Dept: PAIN MEDICINE | Facility: CLINIC | Age: 59
End: 2024-08-06

## 2024-08-06 NOTE — TELEPHONE ENCOUNTER
Caller: Anshul Fowler Jr.    Relationship to patient: Self    Best call back number: [phone] (home)     Patient is needing: PATIENT RECEIVED A CALL FROM THE [facility] AND THE REP TOLD HIM THAT HE NEEDED TO BE AT THE FACILITY AT 6:55AM BUT HE WAS SUPPOSED TO BE COMING IN THE AFTERNOON. 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

## 2024-10-09 ENCOUNTER — OFFICE VISIT (OUTPATIENT)
Dept: FAMILY MEDICINE CLINIC | Facility: CLINIC | Age: 59
End: 2024-10-09
Payer: COMMERCIAL

## 2024-10-09 VITALS
WEIGHT: 203 LBS | OXYGEN SATURATION: 97 % | HEART RATE: 78 BPM | SYSTOLIC BLOOD PRESSURE: 158 MMHG | BODY MASS INDEX: 26.9 KG/M2 | RESPIRATION RATE: 15 BRPM | DIASTOLIC BLOOD PRESSURE: 90 MMHG | HEIGHT: 73 IN

## 2024-10-09 DIAGNOSIS — G89.29 CHRONIC RIGHT-SIDED LOW BACK PAIN WITH RIGHT-SIDED SCIATICA: ICD-10-CM

## 2024-10-09 DIAGNOSIS — M54.41 CHRONIC RIGHT-SIDED LOW BACK PAIN WITH RIGHT-SIDED SCIATICA: ICD-10-CM

## 2024-10-09 DIAGNOSIS — E11.65 TYPE 2 DIABETES MELLITUS WITH HYPERGLYCEMIA, WITHOUT LONG-TERM CURRENT USE OF INSULIN: Primary | ICD-10-CM

## 2024-10-09 LAB
EXPIRATION DATE: ABNORMAL
HBA1C MFR BLD: 8.2 % (ref 4.5–5.7)
Lab: ABNORMAL

## 2024-10-09 PROCEDURE — 99214 OFFICE O/P EST MOD 30 MIN: CPT | Performed by: STUDENT IN AN ORGANIZED HEALTH CARE EDUCATION/TRAINING PROGRAM

## 2024-10-09 PROCEDURE — 83036 HEMOGLOBIN GLYCOSYLATED A1C: CPT | Performed by: STUDENT IN AN ORGANIZED HEALTH CARE EDUCATION/TRAINING PROGRAM

## 2024-10-09 RX ORDER — GABAPENTIN 300 MG/1
300 CAPSULE ORAL 3 TIMES DAILY
Qty: 270 CAPSULE | Refills: 0 | Status: SHIPPED | OUTPATIENT
Start: 2024-10-09 | End: 2025-01-07

## 2024-10-09 RX ORDER — DAPAGLIFLOZIN 10 MG/1
10 TABLET, FILM COATED ORAL DAILY
Qty: 90 TABLET | Refills: 1 | Status: SHIPPED | OUTPATIENT
Start: 2024-10-09

## 2024-10-09 NOTE — PROGRESS NOTES
Office Note     Name: Anshul Fowler Jr.    : 1965     MRN: [id number]     Chief Complaint  Diabetes (Follow up)    Subjective     History of Present Illness:  Anshul Fowler Jr. is a 59 y.o. male who presents today for:    T2DM  -has had some lows when he got overheated outside and hadn't eaten breakfast   -still taking farxiga at 5mg  -not checking BG at home    DDD  -follows with pain management  -they have recommended increased gabapentin to TID, this was patient's previous dose    Past Medical History:   Past Medical History:   Diagnosis Date    Cholelithiasis     Chronic pain disorder     DDD (degenerative disc disease), lumbar     Diabetes mellitus     Extremity pain     Headache     Due to High Blood Pressure at times    Hypertension     Joint pain     Kidney stone     Low back pain     Mixed hyperlipidemia 2023    Neuromuscular disorder     Peripheral neuropathy     Tobacco abuse 10/02/2023       Past Surgical History:   Past Surgical History:   Procedure Laterality Date    COLONOSCOPY      CYSTOSCOPY W/ LASER LITHOTRIPSY         Immunizations:   Immunization History   Administered Date(s) Administered    COVID-19 (PFIZER) Purple Cap Monovalent 2022    Fluzone  >6mos 2010    Fluzone (or Fluarix & Flulaval for VFC) >6mos 10/14/2022    Fluzone Quad >6mos (Multi-dose) 2019    Influenza, Unspecified 2019        Medications:     Current Outpatient Medications:     gabapentin (NEURONTIN) 300 MG capsule, Take 1 capsule by mouth 3 (Three) Times a Day for 90 days., Disp: 270 capsule, Rfl: 0    glimepiride (AMARYL) 1 MG tablet, TAKE 1 TABLET BY MOUTH EVERY DAY, Disp: 90 tablet, Rfl: 0    ibuprofen (ADVIL,MOTRIN) 800 MG tablet, TAKE 1 TABLET BY MOUTH 2 (TWO) TIMES A DAY AS NEEDED FOR MILD PAIN., Disp: 180 tablet, Rfl: 0    losartan (COZAAR) 100 MG tablet, Take 1 tablet by mouth Daily., Disp: 90 tablet, Rfl: 1    tadalafil (CIALIS) 5 MG tablet, Take 1 tablet by mouth  "As Needed for Erectile Dysfunction., Disp: 90 tablet, Rfl: 1    dapagliflozin Propanediol (Farxiga) 10 MG tablet, Take 10 mg by mouth Daily., Disp: 90 tablet, Rfl: 1    Allergies:   Allergies   Allergen Reactions    Penicillins Rash       Family History:   Family History   Problem Relation Age of Onset    Arthritis Mother     Hyperlipidemia Mother     Hypertension Mother     Diabetes Mother     Arthritis Father     Cancer Father     Diabetes Father     Heart disease Father     Hyperlipidemia Father     Depression Father     Hypertension Father        Social History:   Social History     Socioeconomic History    Marital status:    Tobacco Use    Smoking status: Every Day     Current packs/day: 1.00     Average packs/day: 1 pack/day for 30.0 years (30.0 ttl pk-yrs)     Types: Cigarettes     Passive exposure: Current    Smokeless tobacco: Never   Vaping Use    Vaping status: Never Used   Substance and Sexual Activity    Alcohol use: Not Currently    Drug use: Never    Sexual activity: Yes     Partners: Female     Birth control/protection: None         Objective     Vital Signs  /90 (BP Location: Right arm, Patient Position: Sitting, Cuff Size: Adult)   Pulse 78   Resp 15   Ht 185.4 cm (73\")   Wt 92.1 kg (203 lb)   SpO2 97%   BMI 26.78 kg/m²   Estimated body mass index is 26.78 kg/m² as calculated from the following:    Height as of this encounter: 185.4 cm (73\").    Weight as of this encounter: 92.1 kg (203 lb).        Physical Exam  Constitutional:       General: He is not in acute distress.     Appearance: Normal appearance.   HENT:      Head: Normocephalic and atraumatic.   Eyes:      Conjunctiva/sclera: Conjunctivae normal.   Cardiovascular:      Rate and Rhythm: Normal rate and regular rhythm.   Pulmonary:      Effort: Pulmonary effort is normal.      Breath sounds: Normal breath sounds.   Neurological:      Mental Status: He is alert.   Psychiatric:         Mood and Affect: Mood normal.         " Behavior: Behavior normal.         Thought Content: Thought content normal.          Assessment and Plan     Diagnoses and all orders for this visit:    1. Type 2 diabetes mellitus with hyperglycemia, without long-term current use of insulin (Primary)  -     POC Glycosylated Hemoglobin (Hb A1C)  -     dapagliflozin Propanediol (Farxiga) 10 MG tablet; Take 10 mg by mouth Daily.  Dispense: 90 tablet; Refill: 1  -     Comprehensive Metabolic Panel; Future  -     Lipid Panel; Future  -     Lipid Panel  -     Comprehensive Metabolic Panel    2. Chronic right-sided low back pain with right-sided sciatica  -     gabapentin (NEURONTIN) 300 MG capsule; Take 1 capsule by mouth 3 (Three) Times a Day for 90 days.  Dispense: 270 capsule; Refill: 0    Patient is here today to follow-up on diabetes.  His A1c is not at goal of less than 7.  Will increase his Farxiga continue glimepiride and recheck in 3 months.  He has not been checking his blood sugar but did have a low after taking glimepiride and not eating.  He may be going to try the Project 2020 continuous glucose monitor since he does not frequently check blood sugar.    Patient does follow with pain management for back pain.  I am willing to increase his gabapentin from twice a day to 3 times a day and refill was sent for him.  However did discuss he will need to let pain management right 30-day scripts if there is going to be continued titration of this medication.             Follow Up  Return in about 3 months (around 1/9/2025) for Next scheduled follow up.    DO AKASH CamarenaE [facility]  [address] [place] [id number] [phone]    NOTE TO PATIENT: The 21st Century Cures Act makes medical notes like these available to patients in the interest of transparency. However, be advised this is a medical document. It is intended as peer to peer communication. It is written in medical language and may contain  abbreviations or verbiage that are unfamiliar. It may appear blunt or direct. Medical documents are intended to carry relevant information, facts as evident, and the clinical opinion of the practitioner.

## 2024-10-10 LAB
ALBUMIN SERPL-MCNC: 3.9 G/DL (ref 3.8–4.9)
ALP SERPL-CCNC: 72 IU/L (ref 44–121)
ALT SERPL-CCNC: 11 IU/L (ref 0–44)
AST SERPL-CCNC: 14 IU/L (ref 0–40)
BILIRUB SERPL-MCNC: 0.6 MG/DL (ref 0–1.2)
BUN SERPL-MCNC: 11 MG/DL (ref 6–24)
BUN/CREAT SERPL: 15 (ref 9–20)
CALCIUM SERPL-MCNC: 8.7 MG/DL (ref 8.7–10.2)
CHLORIDE SERPL-SCNC: 100 MMOL/L (ref 96–106)
CHOLEST SERPL-MCNC: 232 MG/DL (ref 100–199)
CO2 SERPL-SCNC: 23 MMOL/L (ref 20–29)
CREAT SERPL-MCNC: 0.73 MG/DL (ref 0.76–1.27)
EGFRCR SERPLBLD CKD-EPI 2021: 106 ML/MIN/1.73
GLOBULIN SER CALC-MCNC: 2.9 G/DL (ref 1.5–4.5)
GLUCOSE SERPL-MCNC: 179 MG/DL (ref 70–99)
HDLC SERPL-MCNC: 39 MG/DL
LDLC SERPL CALC-MCNC: 154 MG/DL (ref 0–99)
POTASSIUM SERPL-SCNC: 3.4 MMOL/L (ref 3.5–5.2)
PROT SERPL-MCNC: 6.8 G/DL (ref 6–8.5)
SODIUM SERPL-SCNC: 139 MMOL/L (ref 134–144)
TRIGL SERPL-MCNC: 211 MG/DL (ref 0–149)
VLDLC SERPL CALC-MCNC: 39 MG/DL (ref 5–40)

## 2024-10-11 DIAGNOSIS — E87.6 HYPOKALEMIA: Primary | ICD-10-CM

## 2024-10-14 ENCOUNTER — TELEPHONE (OUTPATIENT)
Dept: FAMILY MEDICINE CLINIC | Facility: CLINIC | Age: 59
End: 2024-10-14
Payer: COMMERCIAL

## 2024-10-14 NOTE — TELEPHONE ENCOUNTER
----- Message from Kamille Goldberg sent at 10/11/2024  9:54 PM EDT -----  Your LDL cholesterol has gone up compared to last year.  We should really consider putting you on a medication to help get this down especially with your diabetes diagnosis.  Ideally we would like this number much closer to 100.  Your potassium is a bit low.  I would like for you to come in and have that rechecked.

## 2024-11-13 ENCOUNTER — TELEMEDICINE (OUTPATIENT)
Dept: FAMILY MEDICINE CLINIC | Facility: CLINIC | Age: 59
End: 2024-11-13
Payer: COMMERCIAL

## 2024-11-13 VITALS — HEIGHT: 73 IN | WEIGHT: 194.3 LBS | BODY MASS INDEX: 25.75 KG/M2

## 2024-11-13 DIAGNOSIS — I10 PRIMARY HYPERTENSION: ICD-10-CM

## 2024-11-13 DIAGNOSIS — E11.65 TYPE 2 DIABETES MELLITUS WITH HYPERGLYCEMIA, WITHOUT LONG-TERM CURRENT USE OF INSULIN: Primary | ICD-10-CM

## 2024-11-13 DIAGNOSIS — E78.2 MIXED HYPERLIPIDEMIA: ICD-10-CM

## 2024-11-13 PROCEDURE — 99214 OFFICE O/P EST MOD 30 MIN: CPT

## 2024-11-13 RX ORDER — LOSARTAN POTASSIUM 100 MG/1
100 TABLET ORAL DAILY
Qty: 60 TABLET | Refills: 0 | Status: SHIPPED | OUTPATIENT
Start: 2024-11-13

## 2024-11-13 RX ORDER — IBUPROFEN 800 MG/1
800 TABLET, FILM COATED ORAL 2 TIMES DAILY PRN
Qty: 180 TABLET | Refills: 0 | Status: SHIPPED | OUTPATIENT
Start: 2024-11-13

## 2024-11-13 RX ORDER — DAPAGLIFLOZIN 10 MG/1
10 TABLET, FILM COATED ORAL DAILY
Qty: 90 TABLET | Refills: 1 | Status: SHIPPED | OUTPATIENT
Start: 2024-11-13

## 2024-11-13 NOTE — PROGRESS NOTES
Follow Up Office Visit      Date:  2024   Patient Name: Anshul Fowler Jr.  : 1965   MRN: [id number]     Chief Complaint   Patient presents with    Follow-up     Review labs       History of Present Illness: Anshul Fowler Jr. is a 59 y.o. male who is here today for follow up to review abnormal lab work.  Patient's primary care provider is temporarily out of the office and he would like to review abnormal lab work today.      DM II:  Last A1c 8.2%    Back pain:  Seeing Dr. Omer  Back pain is patient's primary concern at this time    Cholesterol:  Abnormal  Discussed ASCVD score    Father passed away in August from MI - stopped his anticoagulant   Multiple myeloma  Seeing pain management for Gabapentin     Subjective      Past Medical History:   Diagnosis Date    Cholelithiasis     Chronic pain disorder     DDD (degenerative disc disease), lumbar     Diabetes mellitus     Extremity pain     Headache     Due to High Blood Pressure at times    Hypertension     Joint pain     Kidney stone     Low back pain     Mixed hyperlipidemia 2023    Neuromuscular disorder     Peripheral neuropathy     Tobacco abuse 10/02/2023       Past Surgical History:   Procedure Laterality Date    COLONOSCOPY      CYSTOSCOPY W/ LASER LITHOTRIPSY         Family History   Problem Relation Age of Onset    Arthritis Mother     Hyperlipidemia Mother     Hypertension Mother     Diabetes Mother     Arthritis Father     Cancer Father     Diabetes Father     Heart disease Father     Hyperlipidemia Father     Depression Father     Hypertension Father        Social History     Socioeconomic History    Marital status:    Tobacco Use    Smoking status: Every Day     Current packs/day: 1.00     Average packs/day: 1 pack/day for 30.0 years (30.0 ttl pk-yrs)     Types: Cigarettes     Passive exposure: Current    Smokeless tobacco: Never   Vaping Use    Vaping status: Never Used   Substance and Sexual Activity     "Alcohol use: Not Currently    Drug use: Never    Sexual activity: Yes     Partners: Female     Birth control/protection: None       Current Outpatient Medications   Medication Instructions    dapagliflozin Propanediol (FARXIGA) 10 mg, Oral, Daily    gabapentin (NEURONTIN) 300 mg, Oral, 3 Times Daily    glimepiride (AMARYL) 1 mg, Oral, Daily    ibuprofen (ADVIL,MOTRIN) 800 mg, Oral, 2 Times Daily PRN    losartan (COZAAR) 100 mg, Oral, Daily    tadalafil (CIALIS) 5 mg, Oral, As Needed       Allergies   Allergen Reactions    Penicillins Rash       Objective     Physical Exam  Constitutional:       General: He is not in acute distress.     Appearance: Normal appearance. He is not toxic-appearing.   Pulmonary:      Effort: Pulmonary effort is normal. No respiratory distress.   Neurological:      Mental Status: He is alert and oriented to person, place, and time.   Psychiatric:         Mood and Affect: Mood normal.         Thought Content: Thought content normal.       Vitals:    11/13/24 1528   Weight: 88.1 kg (194 lb 4.8 oz)   Height: 185.4 cm (73\")   PainSc:   7   PainLoc: Back     Body mass index is 25.63 kg/m².     No results found for this or any previous visit.     The 10-year ASCVD risk score (Gonzalo DK, et al., 2019) is: 44.6%    Values used to calculate the score:      Age: 59 years      Sex: Male      Is Non- : No      Diabetic: Yes      Tobacco smoker: Yes      Systolic Blood Pressure: 158 mmHg      Is BP treated: Yes      HDL Cholesterol: 39 mg/dL      Total Cholesterol: 232 mg/dL         Assessment / Plan      Diagnoses and all orders for this visit:    1. 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    Plan:   Continue current medications  Encouraged more consistent blood glucose checks  Encouraged to increase efforts toward ADA diet and daily exercise  Follow up in 4 weeks unless otherwise indicated    Exercise Recommendations:   - Work up to goal of 30 min of walking per day - may split into 15 minute intervals if feasible  - Walking speed should be faster than a casual stroll but still able to carry on normal conversation     Orders:  -     dapagliflozin Propanediol (Farxiga) 10 MG tablet; Take 10 mg by mouth Daily.  Dispense: 90 tablet; Refill: 1    2. Primary hypertension  Assessment & Plan:  Hypertension-poorly controlled   Patient reports poor compliance with antihypertensive medication   When pressed about blood pressure when he checks periodically, patient reports it is usually 180s over 90s to 100  Patient reports he usually checks his blood pressure when \"I start feeling bad\"  Does not report any headaches, blurry  vision, dizziness, chest pain, shortness of breath, or palpitations.  Discussed that his last medication fill was in January 2024 for 90-day supply, patient acknowledges poor compliance and reports he still has some of this medication left over    Plan:  Refill of losartan sent to patient's pharmacy and he agrees to pick medication up and start taking daily  Patient agrees to check blood pressures and keep a log several times per week  Patient agrees to bring blood pressure log to follow-up appointment in 4 weeks  Continue diet and lifestyle changes  Recommend low sodium diet, moderate daily walking at least 30 minutes a " day 5 days a week  Report back to the office if blood pressures consistently >140/90  Patient verbalizes understanding if he develops chest pain, shortness of breath or other alarm symptoms he should call 911 or go to the ER as appropriate    Orders:  -     losartan (COZAAR) 100 MG tablet; Take 1 tablet by mouth Daily.  Dispense: 60 tablet; Refill: 0    3. Mixed hyperlipidemia  Assessment & Plan:  Persistent lipid abnormalities on most recent lab results  Discussed mixed hyperlipidemia with patient  Patient reports familial tendency and acknowledges his father passed away in August from MI  Discussed patient's ASCVD risk score 44.6% and diabetes being important risk factors for taking cholesterol-lowering medication  Patient declines lipid-lowering medications at this time  Will discuss Vascepa with him at follow-up visit in 4 weeks considering report of previous intolerance to statin medication      Other orders  -     ibuprofen (ADVIL,MOTRIN) 800 MG tablet; Take 1 tablet by mouth 2 (Two) Times a Day As Needed for Mild Pain.  Dispense: 180 tablet; Refill: 0         BMI Follow up:  BMI is >= 25 and <30. (Overweight) The following options were offered after discussion;: weight loss educational material (shared in after visit summary), exercise counseling/recommendations, and information on healthy weight added to patient's after visit summary       Patient Education:   Reviewed medications, potential side effects and signs and symptoms to report.   Discussed risk vs benefits of treatment plan with patient including medications, labs, and imaging.    Patient education materials attached to AVS and reviewed with patient during office visit today.   Addressed questions and concerns during visit. Patient verbalized understanding and agrees with tx plan.   Instructed to call the office with any questions and report to ER with any life-threatening symptoms.    Return in about 4 weeks (around 12/11/2024) for Diabetes  nirav.    STANISLAW Gutierrez (Libby)  [facility]   [address] [facility]  [place] [address] [phone]    NOTE TO PATIENT: The 21st Century Cures Act makes medical notes like these available to patients in the interest of transparency. However, be advised this is a medical document. It is intended as peer to peer communication. It is written in medical language and may contain abbreviations or verbiage that are unfamiliar. It may appear blunt or direct. Medical documents are intended to carry relevant information, facts as evident, and the clinical opinion of the practitioner.      EMR Dragon/Transcription disclaimer: Much of this encounter note is an electronic transcription of spoken language to printed text. Electronic transcription of spoken language may permit erroneous, or at times, nonsensical words or phrases to be inadvertently transcribed. Although I have reviewed the note for such errors, some may still exist.

## 2024-11-13 NOTE — ASSESSMENT & PLAN NOTE
Persistent lipid abnormalities on most recent lab results  Discussed mixed hyperlipidemia with patient  Patient reports familial tendency and acknowledges his father passed away in August from MI  Discussed patient's ASCVD risk score 44.6% and diabetes being important risk factors for taking cholesterol-lowering medication  Patient declines lipid-lowering medications at this time  Will discuss Vascepa with him at follow-up visit in 4 weeks considering report of previous intolerance to statin medication

## 2024-11-13 NOTE — ASSESSMENT & PLAN NOTE
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    Plan:   Continue current medications  Encouraged more consistent blood glucose checks  Encouraged to increase efforts toward ADA diet and daily exercise  Follow up in 4 weeks unless otherwise indicated    Exercise Recommendations:   - Work up to goal of 30 min of walking per day - may split into 15 minute intervals if feasible  - Walking speed should be faster than a casual stroll but still able to carry on normal conversation   "

## 2024-11-13 NOTE — ASSESSMENT & PLAN NOTE
"Hypertension-poorly controlled   Patient reports poor compliance with antihypertensive medication   When pressed about blood pressure when he checks periodically, patient reports it is usually 180s over 90s to 100  Patient reports he usually checks his blood pressure when \"I start feeling bad\"  Does not report any headaches, blurry  vision, dizziness, chest pain, shortness of breath, or palpitations.  Discussed that his last medication fill was in January 2024 for 90-day supply, patient acknowledges poor compliance and reports he still has some of this medication left over    Plan:  Refill of losartan sent to patient's pharmacy and he agrees to pick medication up and start taking daily  Patient agrees to check blood pressures and keep a log several times per week  Patient agrees to bring blood pressure log to follow-up appointment in 4 weeks  Continue diet and lifestyle changes  Recommend low sodium diet, moderate daily walking at least 30 minutes a day 5 days a week  Report back to the office if blood pressures consistently >140/90  Patient verbalizes understanding if he develops chest pain, shortness of breath or other alarm symptoms he should call 911 or go to the ER as appropriate  "

## 2024-12-12 ENCOUNTER — OFFICE VISIT (OUTPATIENT)
Dept: PAIN MEDICINE | Facility: CLINIC | Age: 59
End: 2024-12-12
Payer: COMMERCIAL

## 2024-12-12 VITALS — WEIGHT: 203.6 LBS | BODY MASS INDEX: 26.98 KG/M2 | HEIGHT: 73 IN

## 2024-12-12 DIAGNOSIS — M48.062 SPINAL STENOSIS OF LUMBAR REGION WITH NEUROGENIC CLAUDICATION: Primary | ICD-10-CM

## 2024-12-12 DIAGNOSIS — M54.41 CHRONIC RIGHT-SIDED LOW BACK PAIN WITH RIGHT-SIDED SCIATICA: ICD-10-CM

## 2024-12-12 DIAGNOSIS — M47.26 OSTEOARTHRITIS OF SPINE WITH RADICULOPATHY, LUMBAR REGION: ICD-10-CM

## 2024-12-12 DIAGNOSIS — G89.29 CHRONIC RIGHT-SIDED LOW BACK PAIN WITH RIGHT-SIDED SCIATICA: ICD-10-CM

## 2024-12-12 NOTE — PROGRESS NOTES
Primary Physician: Kamille Goldberg DO    CHIEF COMPLAINT or REASON FOR VISIT: Follow-up and Back Pain      Initial HPI 1/16/2023:  Mr. Anshul Fowler is 59 y.o. male who presents as a new patient referral for evaluation treatment of chronic low back pain with radiation to bilateral lower extremities.  Patient states that he has a many decade history of axial low back pain however over the last year he is begun to have radiation into his bilateral lower extremities and the soles of bilateral feet.  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  That subsequently mostly resolved except for left-sided pinky toe numbness tingling and pain.  Patient additionally complains of frequent right lower extremity shooting pain, worst from the right fibular head down to the ankle.  Patient denies any bowel or bladder dysfunction, lower extremity weakness, new onset saddle anesthesia or unexplained weight loss.  He has never had physical therapy, back surgery, back injections.    Interval history:   Patient returns to clinic today.  He was set up for bilateral L3/4 and L4/5 transforaminal epidural steroid injections however he did not undergo this procedure due to scheduling conflicts.  He continues to complain of chronic low back pain with radiation of the bilateral lower extremities.  Pain will radiate down the posterior aspects of his legs extending into his calves.  He denies any radiation to his feet or toes.  He feels as if his symptoms have worsened since his last office visit.  He continues to take gabapentin 300 mg 3 times daily with modest relief.  Patient denies any bowel or bladder dysfunction, lower extremity weakness, new onset saddle anesthesia or unexplained weight loss.  He is interested in strategies to help alleviate his pain.     Interventions:  2/27/23: Caudal DEBBIE with 100%  relief for 3 weeks  5/9/23: Right paramedian L4/5 LESI with 0% relief        Objective Pain Scoring:   BRIEF PAIN INVENTORY:  Total score:   Pain Score    12/12/24 1455   PainSc:   9   PainLoc: Back            PHQ-2:    PHQ-9:    Opioid Risk Tool:         Review of Systems:   ROS negative except as otherwise noted     Past Medical History:   Past Medical History:   Diagnosis Date    Cancer     Skin    Cholelithiasis     Chronic pain disorder     DDD (degenerative disc disease), lumbar     Diabetes mellitus     Extremity pain     Headache     Due to High Blood Pressure at times    Hypertension     Joint pain     Kidney stone     Low back pain     Mixed hyperlipidemia 12/20/2023    Neuromuscular disorder     Peripheral neuropathy     Tobacco abuse 10/02/2023         Past Surgical History:   Past Surgical History:   Procedure Laterality Date    BACK SURGERY      Epidural    COLONOSCOPY      CYSTOSCOPY W/ LASER LITHOTRIPSY      SPINE SURGERY      Epidural         Family History   Family History   Problem Relation Age of Onset    Arthritis Mother     Hyperlipidemia Mother     Hypertension Mother     Diabetes Mother     Arthritis Father     Cancer Father     Diabetes Father     Heart disease Father     Hyperlipidemia Father     Depression Father     Hypertension Father          Social History   Social History     Socioeconomic History    Marital status:    Tobacco Use    Smoking status: Every Day     Current packs/day: 1.00     Average packs/day: 1 pack/day for 30.0 years (30.0 ttl pk-yrs)     Types: Cigarettes     Passive exposure: Current    Smokeless tobacco: Never   Vaping Use    Vaping status: Never Used   Substance and Sexual Activity    Alcohol use: Not Currently    Drug use: Never    Sexual activity: Yes     Partners: Female     Birth control/protection: None        Medications:     Current Outpatient Medications:     dapagliflozin Propanediol (Farxiga) 10 MG tablet, Take 10 mg by mouth Daily., Disp: 90  "tablet, Rfl: 1    gabapentin (NEURONTIN) 300 MG capsule, Take 1 capsule by mouth 3 (Three) Times a Day for 90 days., Disp: 270 capsule, Rfl: 0    glimepiride (AMARYL) 1 MG tablet, TAKE 1 TABLET BY MOUTH EVERY DAY, Disp: 90 tablet, Rfl: 0    ibuprofen (ADVIL,MOTRIN) 800 MG tablet, Take 1 tablet by mouth 2 (Two) Times a Day As Needed for Mild Pain., Disp: 180 tablet, Rfl: 0    losartan (COZAAR) 100 MG tablet, Take 1 tablet by mouth Daily., Disp: 60 tablet, Rfl: 0    tadalafil (CIALIS) 5 MG tablet, Take 1 tablet by mouth As Needed for Erectile Dysfunction., Disp: 90 tablet, Rfl: 1        Physical Exam:     Vitals:    12/12/24 1455   Weight: 92.4 kg (203 lb 9.6 oz)   Height: 185.4 cm (72.99\")   PainSc:   9   PainLoc: Back              General: Alert and oriented, No acute distress.   HEENT: Normocephalic, atraumatic.   Cardiovascular: No gross edema  Respiratory: Respirations are non-labored  Thoracic Spine:   Inspection: no gross abnormality  Paraspinal muscle palpation: nontender  Spinous process palpation: nontender    Lumbar Spine:   No masses or atrophy  Range of motion - Flexion normal. Extension normal. Right Lat Bending normal. Left Lat Bending normal  Facet Loading: Positive bilaterally  Facet Palpation - tender bilaterally  PSIS tenderness - Negative bilaterally  Sulaiman's/KIRSTIN/Thigh thrust - Negative bilaterally  Straight leg raise: Positive bilaterally  Slump test: Positive bilaterally    Motor Exam:  Strength: Rate on 1-5 scale Right Left    L1/2- hip flexion 5 5    L3- knee extension 5 5    L4- ankle dorsiflexion 5 5    L5- great toe extension 5 5    S1- ankle plantarflexion 5 5    Sensory Exam: Decreased sensation to light touch bilateral S1 dermatome  Positive Tinel's right fibular head/common peroneal nerve    Neurologic: Cranial Nerves II-XII are grossly intact.   Clonus -negative bilaterally    Psychiatric: Cooperative.   Gait: Normal   Assistive Devices: None      Imaging Studies:   Results for " orders placed during the hospital encounter of 11/20/22    MRI Lumbar Spine Without Contrast    Narrative  Examination: MRI LUMBAR SPINE WO CONTRAST-    Date of Exam: 11/20/2022 10:14 AM    Indication: worsening right sided sciatica; M54.41-Lumbago with  sciatica, right side; G89.29-Other chronic pain.    Comparison: None available.    Technique: Standard noncontrast MR pulse sequences of the lumbar spine  were obtained.    Findings:  Five lumbar type vertebral bodies are identified.  Alignment is  anatomic.  There is mild-to-moderate narrowing of the L3-L4 and L4-L5  discs with desiccation and there is mild narrowing of the L1-L2 disc  with desiccation. There is similar mild narrowing and desiccation at the  T12-L1 disc. The L2-L3 and L5-S1 discs appear within normal limits.  There there is a small chronic Schmorl's node at the inferior L1  endplate. Vertebral bodies maintain normal height.  Distal spinal cord  and conus medullaris appear unremarkable terminating at the T12-L1  level.  Cauda equina appears unremarkable.  There is mild degenerative  bone marrow heterogeneity. No focal bone marrow edema or marrow  replacing process. Paraspinal musculature is preserved.  There is  colonic diverticulosis and there is scarring in the pelvic fat, likely  related to remote diverticulitis.    L1-L2: There is concentric disc bulge and marginal osteophyte formation.  Facet joints appear unremarkable. There is mild bilateral neural  foraminal narrowing.    L2-L3: There is concentric disc bulge and mild facet and ligamentum  flavum hypertrophy. There is mild-to-moderate bilateral neural foraminal  narrowing without spinal canal compromise.    L3-L4: There is concentric disc bulge and extensive marginal osteophyte  formation. There is superimposed right paracentral and left foraminal  disc protrusion. There is moderate right and mild left facet and  ligamentum flavum hypertrophy. There is moderate bilateral neural  foraminal  narrowing and moderate narrowing of the spinal canal.    L4-L5: There is concentric disc bulge and marginal osteophyte formation.  There is superimposed right foraminal disc protrusion. The disc contacts  and dorsally displaces the exited right L4 nerve. There is moderate  facet and ligamentum flavum hypertrophy. There is moderate bilateral  neural foraminal narrowing and mild narrowing of the spinal canal with  effacement of the lateral recesses.    L5-S1: There is slight concentric disc bulge and mild right facet  hypertrophy. No neural foraminal or spinal canal narrowing.    Impression  Moderate lumbar spondylosis with varying degrees of neural foraminal  narrowing and moderate narrowing of the spinal canal at the L3-L4 level.    This report was finalized on 11/20/2022 2:19 PM by Lincoln Kay MD.      Impression & Plan:   1/16/2023: Anshul Fowler is a 59 y.o. male with past medical history significant for DM, HTN who presents to the pain clinic for evaluation and treatment of chronic low back pain with radiation to bilateral lower extremities and feet.  I personally interpreted the patient's lumbar MRI dated 11/20/2022 which demonstrates relatively well-preserved disc height with mild degenerative disc disease at L3/L4 and L4/L5 with posterior disc herniations causing bilateral NFS and lateral recess stenosis at L3/L4, right NFS at L4/L5, mild canal stenosis at L3/L4.  Additionally there is facet hypertrophy and joint effusions at L3/4 and L4/L5.  Clinical examination consistent with chronic axial low back pain secondary to facet degenerative disease with more recent radicular type symptoms.  I also have some suspicion for common peroneal nerve dysfunction.  I will obtain bilateral lower extremity EMG/NCV to assess.  4/7/2023: Excellent but transient benefit from caudal is diagnostic for neuraxial etiology (neurogenic claudication).  Pending EMG/NCV still.  We will trial L4/5 interlaminar epidural closer to the  level of his stenosis as well as start gabapentin.  7/7/23: Continues to twice daily dosing of gabapentin.  If pain worsens can consider right-sided transforaminal.  2/28/2024: Refill gabapentin.  Can consider right-sided transforaminal versus repeat caudal DEBBIE if pain worsens.   7/22/2024: Good relief with gabapentin.  PCP currently prescribing.  Will plan for bilateral L3/4 and L4/5 TFESI.  12/12/2024: Will schedule bilateral L3/4 and L4/5 TFESI as originally planned.    1. Spinal stenosis of lumbar region with neurogenic claudication    2. Osteoarthritis of spine with radiculopathy, lumbar region    3. Chronic right-sided low back pain with right-sided sciatica              PLAN:  1. Medication Recommendations: Continue ibuprofen.    -Agree with gabapentin.  May consider increasing to 600 mg    2. Physical Therapy: New PT referral    3. Psychological: defer    4. Complementary and alternative (CAM) Therapies:     5. Labs:     6. Imaging: None indicated    7. Interventions: Schedule bilateral L3/4 and L4/5 transforaminal epidural steroid injections (29795, [id number]).   We discussed epidural steroid injection to improve pain.  If greater than 50% relief for at least 2 to 3 months can consider repeat as needed every 3 to 4 months.  Had a discussion with the patient regarding the risk of the procedure including bleeding, infection, damage to surrounding structures.  We discussed the potential adverse effects of corticosteroid injection including flushing of the face, lipodystrophy, skin discoloration, elevated blood glucose, increased blood pressure.  Risks of frequent steroid administration includes weight gain, hormonal changes, mood changes, osteoporosis.     8. Referrals: Will most likely refer to neurosurgery if minimal or transient relief    9. Records requested: n/a    10. Lifestyle goals:    Follow-up 4 weeks after injection      [facility]  Eliot Alanis PA-C

## 2024-12-31 ENCOUNTER — OUTSIDE FACILITY SERVICE (OUTPATIENT)
Dept: PAIN MEDICINE | Facility: CLINIC | Age: 59
End: 2024-12-31
Payer: COMMERCIAL

## 2024-12-31 ENCOUNTER — DOCUMENTATION (OUTPATIENT)
Dept: PAIN MEDICINE | Facility: CLINIC | Age: 59
End: 2024-12-31

## 2024-12-31 NOTE — PROGRESS NOTES
[facility]  [address] [place] [address]      PROCEDURE: Fluoroscopically-guided  Lumbar Transforaminal Epidural Steroid Injection - bilateral L3/4 and L4/5    PRE-OP DIAGNOSIS: Lumbar radiculopathy  POST-OP DIAGNOSIS: Lumbar radiculopathy    BLOOD THINNERS (ANTIPLATELETS/ANTICOAGULANTS): Were discussed with the patient and [place] Guidelines were followed.     CONSENT: Risks, benefits and options were explained to the patient, all questions were answered and written informed consent was obtained.     ANESTHESIA:  Local only    PROCEDURE NOTE: A pre-procedural time out was performed to confirm the correct patient, procedure, side, and site. Standard ASA monitors were applied and oxygen via nasal cannula was provided. All proceduralists donned sterile gloves with masks and surgical hats. The patient was placed prone with pillow under the abdomen and all pressure points padded. The patient's lumbar spine was prepped in standard fashion using Chlorhexidine and draped with sterile towels. The target neuroforamen was identified using oblique fluoroscopy and the superior vertebral body endplate squared. The overlying skin and subcutaneous tissue was anesthetized with 1% lidocaine. A 22 gauge 3.5 inch spinal needle with bent tip was incrementally advanced using intermittent fluoroscopy to the 6 o'clock position of the target pedicle in the mid-neuroforamen using oblique, AP and lateral intermittent fluoroscopy. After negative aspiration of blood and cerebrospinal fluid, needle placement was confirmed with 1 ml of omnipaque 180 mgI/ml contrast using AP fluoroscopic imaging. Imaging revealed a clear outline of the target spinal nerve with proximal spread of agent through the neuroforamen medially to the epidural space, without evidence of intravascular or intrathecal spread. After negative aspiration, a mixture containing dexamethasone 5 mg steroid and lidocaine 1% - 1 ml local  anesthetic for a total volume of 1.5 ml was injected under direct visualization with fluoroscopy. The needle was flushed, removed and a bandage applied.  The same procedure utilizing the same technique was performed at each target level listed above.    EBL: None     COMPLICATIONS: None     The patient was monitored in recovery area until all discharge criteria were met. Vital signs remained stable throughout the procedure and in the recovery area. There were no immediate complications and the patient tolerated the procedure well. Sensory and motor exam was unchanged from baseline. The patient received written discharge instructions prior to discharge.     FOLLOW UP: As scheduled     ADDITIONAL NOTES: []        [facility]       Nasir Omer MD     CODES:  [id number]  [id number]

## 2025-01-22 ENCOUNTER — OFFICE VISIT (OUTPATIENT)
Dept: PAIN MEDICINE | Facility: CLINIC | Age: 60
End: 2025-01-22
Payer: COMMERCIAL

## 2025-01-22 VITALS — WEIGHT: 204.9 LBS | HEIGHT: 73 IN | BODY MASS INDEX: 27.16 KG/M2

## 2025-01-22 DIAGNOSIS — M54.41 CHRONIC RIGHT-SIDED LOW BACK PAIN WITH RIGHT-SIDED SCIATICA: ICD-10-CM

## 2025-01-22 DIAGNOSIS — M48.062 SPINAL STENOSIS OF LUMBAR REGION WITH NEUROGENIC CLAUDICATION: Primary | ICD-10-CM

## 2025-01-22 DIAGNOSIS — G89.29 CHRONIC RIGHT-SIDED LOW BACK PAIN WITH RIGHT-SIDED SCIATICA: ICD-10-CM

## 2025-01-22 DIAGNOSIS — Z51.81 THERAPEUTIC DRUG MONITORING: Primary | ICD-10-CM

## 2025-01-22 DIAGNOSIS — M47.26 OSTEOARTHRITIS OF SPINE WITH RADICULOPATHY, LUMBAR REGION: ICD-10-CM

## 2025-01-22 RX ORDER — GABAPENTIN 300 MG/1
300 CAPSULE ORAL 3 TIMES DAILY
Qty: 90 CAPSULE | Refills: 2 | Status: SHIPPED | OUTPATIENT
Start: 2025-01-22

## 2025-01-22 NOTE — TELEPHONE ENCOUNTER
Assume responsibility of gabapentin  Gabapentin 300 mg 3 times daily, 90 pills, #2 refills  Beck reviewed and compliant  Controlled substance agreement signed in office  Pending UDS  Appropriate follow-up visit scheduled

## 2025-01-22 NOTE — PROGRESS NOTES
Primary Physician: Kamille Goldberg DO    CHIEF COMPLAINT or REASON FOR VISIT: Follow-up (Post Lumbar Transforaminal Epidural Steroid Injection - bilateral L3/4 and L4/5), Back Pain, and Med Management      Initial HPI 1/16/2023:  Mr. Anshul Fowler is 59 y.o. male who presents as a new patient referral for evaluation treatment of chronic low back pain with radiation to bilateral lower extremities.  Patient states that he has a many decade history of axial low back pain however over the last year he is begun to have radiation into his bilateral lower extremities and the soles of bilateral feet.  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  That subsequently mostly resolved except for left-sided pinky toe numbness tingling and pain.  Patient additionally complains of frequent right lower extremity shooting pain, worst from the right fibular head down to the ankle.  Patient denies any bowel or bladder dysfunction, lower extremity weakness, new onset saddle anesthesia or unexplained weight loss.  He has never had physical therapy, back surgery, back injections.    Interval history:   Patient returns to clinic today after undergoing bilateral lumbar transforaminal epidural steroid injections.  Additionally, he is here for medication management.  He has been taking gabapentin 300 mg 3 times daily as prescribed by his primary care provider.  He reports good relief from this procedure.  He has noticed a significant improvement in his left lower extremity symptoms as well as his right lower extremity symptoms.  He still has some pain within his right calf however this is significantly improved compared to before.  He has occasional back pain.  He continues to take gabapentin with good relief.  Overall, he has significantly improved and is happy with the relief that he has received.  No new  issues or complaints at this time.  He is almost out of his gabapentin.    Interventions:  2/27/23: Caudal DEBBIE with 100% relief for 3 weeks  5/9/23: Right paramedian L4/5 LESI with 0% relief  12/31/2024: Bilateral L3/4 and L4/5 TFESI with 65% relief ongoing.      Objective Pain Scoring:   BRIEF PAIN INVENTORY:  Total score:   Pain Score    01/22/25 1455   PainSc:   7   PainLoc: Back            PHQ-2:    PHQ-9:    Opioid Risk Tool:         Review of Systems:   ROS negative except as otherwise noted     Past Medical History:   Past Medical History:   Diagnosis Date    Cancer     Skin    Cholelithiasis     Chronic pain disorder     DDD (degenerative disc disease), lumbar     Diabetes mellitus     Extremity pain     Headache     Due to High Blood Pressure at times    Hypertension     Joint pain     Kidney stone     Low back pain     Mixed hyperlipidemia 12/20/2023    Neuromuscular disorder     Peripheral neuropathy     Tobacco abuse 10/02/2023         Past Surgical History:   Past Surgical History:   Procedure Laterality Date    BACK SURGERY      Epidural    COLONOSCOPY      CYSTOSCOPY W/ LASER LITHOTRIPSY      SPINE SURGERY      Epidural         Family History   Family History   Problem Relation Age of Onset    Arthritis Mother     Hyperlipidemia Mother     Hypertension Mother     Diabetes Mother     Arthritis Father     Cancer Father     Diabetes Father     Heart disease Father     Hyperlipidemia Father     Depression Father     Hypertension Father          Social History   Social History     Socioeconomic History    Marital status:    Tobacco Use    Smoking status: Some Days     Current packs/day: 1.00     Average packs/day: 1 pack/day for 30.0 years (30.0 ttl pk-yrs)     Types: Cigarettes     Passive exposure: Current    Smokeless tobacco: Never   Vaping Use    Vaping status: Never Used   Substance and Sexual Activity    Alcohol use: Not Currently    Drug use: Never    Sexual activity: Yes     Partners:  "Female     Birth control/protection: None        Medications:     Current Outpatient Medications:     dapagliflozin Propanediol (Farxiga) 10 MG tablet, Take 10 mg by mouth Daily., Disp: 90 tablet, Rfl: 1    glimepiride (AMARYL) 1 MG tablet, TAKE 1 TABLET BY MOUTH EVERY DAY, Disp: 90 tablet, Rfl: 0    ibuprofen (ADVIL,MOTRIN) 800 MG tablet, Take 1 tablet by mouth 2 (Two) Times a Day As Needed for Mild Pain., Disp: 180 tablet, Rfl: 0    losartan (COZAAR) 100 MG tablet, Take 1 tablet by mouth Daily., Disp: 60 tablet, Rfl: 0    tadalafil (CIALIS) 5 MG tablet, Take 1 tablet by mouth As Needed for Erectile Dysfunction., Disp: 90 tablet, Rfl: 1    gabapentin (NEURONTIN) 300 MG capsule, Take 1 capsule by mouth 3 (Three) Times a Day for 90 days., Disp: 270 capsule, Rfl: 0        Physical Exam:     Vitals:    01/22/25 1455   Weight: 92.9 kg (204 lb 14.4 oz)   Height: 185.4 cm (72.99\")   PainSc:   7   PainLoc: Back              General: Alert and oriented, No acute distress.   HEENT: Normocephalic, atraumatic.   Cardiovascular: No gross edema  Respiratory: Respirations are non-labored  Thoracic Spine:   Inspection: no gross abnormality  Paraspinal muscle palpation: nontender  Spinous process palpation: nontender    Lumbar Spine:   No masses or atrophy  Range of motion - Flexion normal. Extension normal. Right Lat Bending normal. Left Lat Bending normal  Facet Loading: Positive bilaterally  Facet Palpation - tender bilaterally  PSIS tenderness - Negative bilaterally  Sulaiman's/KIRSTIN/Thigh thrust - Negative bilaterally  Straight leg raise: Positive bilaterally  Slump test: Positive bilaterally    Motor Exam:  Strength: Rate on 1-5 scale Right Left    L1/2- hip flexion 5 5    L3- knee extension 5 5    L4- ankle dorsiflexion 5 5    L5- great toe extension 5 5    S1- ankle plantarflexion 5 5    Sensory Exam: Decreased sensation to light touch bilateral S1 dermatome  Positive Tinel's right fibular head/common peroneal " nerve    Neurologic: Cranial Nerves II-XII are grossly intact.   Clonus -negative bilaterally    Psychiatric: Cooperative.   Gait: Normal   Assistive Devices: None      Imaging Studies:   Results for orders placed during the hospital encounter of 11/20/22    MRI Lumbar Spine Without Contrast    Narrative  Examination: MRI LUMBAR SPINE WO CONTRAST-    Date of Exam: 11/20/2022 10:14 AM    Indication: worsening right sided sciatica; M54.41-Lumbago with  sciatica, right side; G89.29-Other chronic pain.    Comparison: None available.    Technique: Standard noncontrast MR pulse sequences of the lumbar spine  were obtained.    Findings:  Five lumbar type vertebral bodies are identified.  Alignment is  anatomic.  There is mild-to-moderate narrowing of the L3-L4 and L4-L5  discs with desiccation and there is mild narrowing of the L1-L2 disc  with desiccation. There is similar mild narrowing and desiccation at the  T12-L1 disc. The L2-L3 and L5-S1 discs appear within normal limits.  There there is a small chronic Schmorl's node at the inferior L1  endplate. Vertebral bodies maintain normal height.  Distal spinal cord  and conus medullaris appear unremarkable terminating at the T12-L1  level.  Cauda equina appears unremarkable.  There is mild degenerative  bone marrow heterogeneity. No focal bone marrow edema or marrow  replacing process. Paraspinal musculature is preserved.  There is  colonic diverticulosis and there is scarring in the pelvic fat, likely  related to remote diverticulitis.    L1-L2: There is concentric disc bulge and marginal osteophyte formation.  Facet joints appear unremarkable. There is mild bilateral neural  foraminal narrowing.    L2-L3: There is concentric disc bulge and mild facet and ligamentum  flavum hypertrophy. There is mild-to-moderate bilateral neural foraminal  narrowing without spinal canal compromise.    L3-L4: There is concentric disc bulge and extensive marginal osteophyte  formation. There  is superimposed right paracentral and left foraminal  disc protrusion. There is moderate right and mild left facet and  ligamentum flavum hypertrophy. There is moderate bilateral neural  foraminal narrowing and moderate narrowing of the spinal canal.    L4-L5: There is concentric disc bulge and marginal osteophyte formation.  There is superimposed right foraminal disc protrusion. The disc contacts  and dorsally displaces the exited right L4 nerve. There is moderate  facet and ligamentum flavum hypertrophy. There is moderate bilateral  neural foraminal narrowing and mild narrowing of the spinal canal with  effacement of the lateral recesses.    L5-S1: There is slight concentric disc bulge and mild right facet  hypertrophy. No neural foraminal or spinal canal narrowing.    Impression  Moderate lumbar spondylosis with varying degrees of neural foraminal  narrowing and moderate narrowing of the spinal canal at the L3-L4 level.    This report was finalized on 11/20/2022 2:19 PM by Lincoln Kay MD.      Impression & Plan:   1/16/2023: Anshul Fowler is a 59 y.o. male with past medical history significant for DM, HTN who presents to the pain clinic for evaluation and treatment of chronic low back pain with radiation to bilateral lower extremities and feet.  I personally interpreted the patient's lumbar MRI dated 11/20/2022 which demonstrates relatively well-preserved disc height with mild degenerative disc disease at L3/L4 and L4/L5 with posterior disc herniations causing bilateral NFS and lateral recess stenosis at L3/L4, right NFS at L4/L5, mild canal stenosis at L3/L4.  Additionally there is facet hypertrophy and joint effusions at L3/4 and L4/L5.  Clinical examination consistent with chronic axial low back pain secondary to facet degenerative disease with more recent radicular type symptoms.  I also have some suspicion for common peroneal nerve dysfunction.  I will obtain bilateral lower extremity EMG/NCV to  assess.  4/7/2023: Excellent but transient benefit from caudal is diagnostic for neuraxial etiology (neurogenic claudication).  Pending EMG/NCV still.  We will trial L4/5 interlaminar epidural closer to the level of his stenosis as well as start gabapentin.  7/7/23: Continues to twice daily dosing of gabapentin.  If pain worsens can consider right-sided transforaminal.  2/28/2024: Refill gabapentin.  Can consider right-sided transforaminal versus repeat caudal DEBBIE if pain worsens.   7/22/2024: Good relief with gabapentin.  PCP currently prescribing.  Will plan for bilateral L3/4 and L4/5 TFESI.  12/12/2024: Will schedule bilateral L3/4 and L4/5 TFESI as originally planned.  1/22/2025: Will assume responsibility of gabapentin.  Good relief from transforaminal approach.  Can consider repeat if needed.    1. Spinal stenosis of lumbar region with neurogenic claudication    2. Osteoarthritis of spine with radiculopathy, lumbar region                PLAN:  1. Medication Recommendations:   -Assume responsibility of gabapentin  -Gabapentin 300 mg 3 times daily, 90 pills, #2 refills  As part of this patient's treatment plan, patient will be prescribed controlled substances.  The patient has been made aware of appropriate use of such medications, including potential risk of somnolent, limited ability to drive and/or work safely, and potential for dependence or overdose.  He has been made clear that his medications refused by this patient only, without concomitant use of alcohol or other substances as prescribed.  Controlled substance status of medication discussed with patient, discussed risk of medication including abuse potential and diversion potential and need to follow-up for reevaluation appointment in order to receive further refills.  Beck was reviewed and compliant.     2. Physical Therapy: Continue PT    3. Psychological: defer    4. Complementary and alternative (CAM) Therapies:     5. Labs: Obtain compliance  urine drug screen    6. Imaging: None indicated    7. Interventions: Can consider repeat bilateral L3/4 and L4/5 transforaminal epidural steroid injections ([id number], [id number]).     8. Referrals: Will most likely refer to neurosurgery if minimal or transient relief to repeat injection    9. Records requested: Beck reviewed    10. Lifestyle goals:    Follow-up 3 months for medication management    [facility]  Eliot Alanis PA-C

## 2025-01-31 DIAGNOSIS — E11.65 TYPE 2 DIABETES MELLITUS WITH HYPERGLYCEMIA, WITHOUT LONG-TERM CURRENT USE OF INSULIN: ICD-10-CM

## 2025-01-31 RX ORDER — GLIMEPIRIDE 1 MG/1
1 TABLET ORAL DAILY
Qty: 90 TABLET | Refills: 0 | Status: SHIPPED | OUTPATIENT
Start: 2025-01-31

## 2025-02-07 ENCOUNTER — OFFICE VISIT (OUTPATIENT)
Dept: FAMILY MEDICINE CLINIC | Facility: CLINIC | Age: 60
End: 2025-02-07
Payer: COMMERCIAL

## 2025-02-07 VITALS
WEIGHT: 210 LBS | SYSTOLIC BLOOD PRESSURE: 160 MMHG | HEIGHT: 73 IN | BODY MASS INDEX: 27.83 KG/M2 | HEART RATE: 65 BPM | OXYGEN SATURATION: 95 % | DIASTOLIC BLOOD PRESSURE: 92 MMHG

## 2025-02-07 DIAGNOSIS — G89.29 CHRONIC PAIN OF BOTH KNEES: ICD-10-CM

## 2025-02-07 DIAGNOSIS — I10 PRIMARY HYPERTENSION: ICD-10-CM

## 2025-02-07 DIAGNOSIS — M54.16 LUMBAR RADICULOPATHY: ICD-10-CM

## 2025-02-07 DIAGNOSIS — Z00.00 ENCOUNTER FOR WELLNESS EXAMINATION IN ADULT: Primary | ICD-10-CM

## 2025-02-07 DIAGNOSIS — M25.561 CHRONIC PAIN OF BOTH KNEES: ICD-10-CM

## 2025-02-07 DIAGNOSIS — E11.65 TYPE 2 DIABETES MELLITUS WITH HYPERGLYCEMIA, WITHOUT LONG-TERM CURRENT USE OF INSULIN: ICD-10-CM

## 2025-02-07 DIAGNOSIS — M25.562 CHRONIC PAIN OF BOTH KNEES: ICD-10-CM

## 2025-02-07 DIAGNOSIS — Z12.2 SCREENING FOR LUNG CANCER: ICD-10-CM

## 2025-02-07 DIAGNOSIS — E78.2 MIXED HYPERLIPIDEMIA: ICD-10-CM

## 2025-02-07 LAB
BUPRENORPHINE SERPL-MCNC: NEGATIVE NG/ML
MDMA SERPLBLD-MCNC: NEGATIVE NG/ML
PCP: NEGATIVE
POC AMPHETAMINES: NEGATIVE
POC BARBITURATES: NEGATIVE
POC BENZODIAZEPHINES: NEGATIVE
POC COCAINE: NEGATIVE
POC METHADONE: NEGATIVE
POC METHAMPHETAMINE SCREEN URINE: NEGATIVE
POC OPIATES: NEGATIVE
POC OXYCODONE: NEGATIVE
POC THC: NEGATIVE

## 2025-02-07 PROCEDURE — 99214 OFFICE O/P EST MOD 30 MIN: CPT | Performed by: STUDENT IN AN ORGANIZED HEALTH CARE EDUCATION/TRAINING PROGRAM

## 2025-02-07 PROCEDURE — 99396 PREV VISIT EST AGE 40-64: CPT | Performed by: STUDENT IN AN ORGANIZED HEALTH CARE EDUCATION/TRAINING PROGRAM

## 2025-02-07 RX ORDER — IBUPROFEN 800 MG/1
800 TABLET, FILM COATED ORAL 2 TIMES DAILY PRN
Qty: 180 TABLET | Refills: 0 | Status: SHIPPED | OUTPATIENT
Start: 2025-02-07

## 2025-02-07 RX ORDER — ROSUVASTATIN CALCIUM 5 MG/1
5 TABLET, COATED ORAL NIGHTLY
Qty: 90 TABLET | Refills: 3 | Status: SHIPPED | OUTPATIENT
Start: 2025-02-07

## 2025-02-07 RX ORDER — LOSARTAN POTASSIUM 100 MG/1
100 TABLET ORAL DAILY
Qty: 90 TABLET | Refills: 1 | Status: SHIPPED | OUTPATIENT
Start: 2025-02-07

## 2025-02-07 RX ORDER — AMLODIPINE BESYLATE 5 MG/1
5 TABLET ORAL DAILY
Qty: 90 TABLET | Refills: 1 | Status: SHIPPED | OUTPATIENT
Start: 2025-02-07

## 2025-02-07 NOTE — PROGRESS NOTES
Subjective   Anshul Fowler Jr. is a 59 y.o. male and is here for a comprehensive physical exam. The patient reports  as below .    Do you take any herbs or supplements that were not prescribed by a doctor? no  Are you taking calcium supplements? no  Are you taking aspirin daily? no      The following portions of the patient's history were reviewed and updated as appropriate: allergies, current medications, past family history, past medical history, past social history, past surgical history, and problem list.    Diet: varied   Exercise: limited because of back    Additional Issues Addressed:    Lumbar radiculopathy  -pt on gabapentin, was getting through pain management  -pt stated he may want us to do it. I can do this but note from pain management says Dr. Omer is taking this over. If patient wants to move rx here that's fine.  -most recent procedure did improve things     HTN  -checks BP at home and still running 160s/80s  -Denies chest pain, shortness of air, dizziness, vision changes, presyncope or syncope    T2DM  -eye exam [facility]   -taking farxiga        Objective   Physical Exam  Constitutional:       Appearance: Normal appearance.   HENT:      Head: Normocephalic and atraumatic.      Mouth/Throat:      Mouth: Mucous membranes are moist.   Eyes:      General: No scleral icterus.     Conjunctiva/sclera: Conjunctivae normal.   Cardiovascular:      Rate and Rhythm: Normal rate and regular rhythm.      Pulses:           Dorsalis pedis pulses are 2+ on the right side and 2+ on the left side.        Posterior tibial pulses are 2+ on the right side and 2+ on the left side.      Heart sounds: No murmur heard.  Pulmonary:      Effort: Pulmonary effort is normal. No respiratory distress.      Breath sounds: Normal breath sounds. No wheezing.   Abdominal:      General: Abdomen is flat. Bowel sounds are normal. There is no distension.      Palpations: Abdomen is soft.      Tenderness: There is  no abdominal tenderness. There is no guarding or rebound.   Feet:      Right foot:      Skin integrity: No skin breakdown.      Left foot:      Skin integrity: No skin breakdown.      Comments: Diabetic Foot Exam Performed and Monofilament Test Performed      Skin:     General: Skin is warm and dry.   Neurological:      Mental Status: He is alert. Mental status is at baseline.   Psychiatric:         Mood and Affect: Mood normal.         Behavior: Behavior normal.         Thought Content: Thought content normal.         Judgment: Judgment normal.            Assessment & Plan   Healthy male exam.     Diagnoses and all orders for this visit:    1. Encounter for wellness examination in adult (Primary)  -     CBC & Differential  -     Comprehensive Metabolic Panel  -     Lipid Panel  -     Hemoglobin A1c  -     Vitamin D,25-Hydroxy  -     TSH Rfx On Abnormal To Free T4    2. Primary hypertension  Assessment & Plan:  Hypertension is uncontrolled  Medication changes per orders.  Blood pressure will be reassessedat the next regular appointment.    Orders:  -     losartan (COZAAR) 100 MG tablet; Take 1 tablet by mouth Daily.  Dispense: 90 tablet; Refill: 1  -     amLODIPine (Norvasc) 5 MG tablet; Take 1 tablet by mouth Daily.  Dispense: 90 tablet; Refill: 1    3. Lumbar radiculopathy  -     ibuprofen (ADVIL,MOTRIN) 800 MG tablet; Take 1 tablet by mouth 2 (Two) Times a Day As Needed for Mild Pain.  Dispense: 180 tablet; Refill: 0    4. Type 2 diabetes mellitus with hyperglycemia, without long-term current use of insulin  Assessment & Plan:  Intermittently controlled. Continue to monitor. Follow up in 3 months    Orders:  -     Microalbumin / Creatinine Urine Ratio - Urine, Clean Catch    5. Chronic pain of both knees  -     XR Knee 1 or 2 View Bilateral; Future  -     POC Urine Drug Screen, Triage    6. Mixed hyperlipidemia  -     rosuvastatin (CRESTOR) 5 MG tablet; Take 1 tablet by mouth Every Night.  Dispense: 90 tablet;  Refill: 3    7. Screening for lung cancer  -      CT Chest Low Dose Cancer Screening WO; Future             Patient Counseling:  --Nutrition: Stressed importance of moderation in sodium/caffeine intake, saturated fat and cholesterol, caloric balance, sufficient intake of fresh fruits, vegetables, fiber, calcium, iron, and 1 mg of folate supplement per day (for females capable of pregnancy).  --Exercise: Stressed the importance of regular exercise.   --Substance Abuse: Discussed cessation/primary prevention of tobacco, alcohol, or other drug use; driving or other dangerous activities under the influence; availability of treatment for abuse.    --Sexuality: Discussed sexually transmitted diseases, partner selection, use of condoms, avoidance of unintended pregnancy  and contraceptive alternatives.   --Injury prevention: Discussed safety belts, safety helmets, smoke detector, smoking near bedding or upholstery.   --Dental health: Discussed importance of regular tooth brushing, flossing, and dental visits.  --Immunizations reviewed.  --After hours service discussed with patient      Return in about 3 months (around 5/7/2025) for Next scheduled follow up.        DO YASMEEN Camarena [facility]  [address] [place] [id number] [phone]

## 2025-02-11 LAB
ALBUMIN/CREAT UR: 5 MG/G CREAT (ref 0–29)
CREAT UR-MCNC: 125.1 MG/DL
MICROALBUMIN UR-MCNC: 6.6 UG/ML

## 2025-05-01 ENCOUNTER — PATIENT MESSAGE (OUTPATIENT)
Dept: PAIN MEDICINE | Facility: CLINIC | Age: 60
End: 2025-05-01
Payer: COMMERCIAL

## 2025-05-05 ENCOUNTER — TELEPHONE (OUTPATIENT)
Dept: PAIN MEDICINE | Facility: CLINIC | Age: 60
End: 2025-05-05

## 2025-05-05 NOTE — TELEPHONE ENCOUNTER
Provider:     Caller: Anshul Fowler Jr.    Relationship to Patient: Self    Pharmacy: [facility]     Phone Number: [phone]    Reason for Call: PT SENT A MESSAGE TO DR CARRERO ABOUT NEEDING A REFERRAL AND HIS RX REFILLED - PLEASE SEE AND ADVISE

## 2025-05-06 DIAGNOSIS — G89.29 CHRONIC RIGHT-SIDED LOW BACK PAIN WITH RIGHT-SIDED SCIATICA: ICD-10-CM

## 2025-05-06 DIAGNOSIS — E11.65 TYPE 2 DIABETES MELLITUS WITH HYPERGLYCEMIA, WITHOUT LONG-TERM CURRENT USE OF INSULIN: ICD-10-CM

## 2025-05-06 DIAGNOSIS — M54.41 CHRONIC RIGHT-SIDED LOW BACK PAIN WITH RIGHT-SIDED SCIATICA: ICD-10-CM

## 2025-05-06 NOTE — TELEPHONE ENCOUNTER
"Surgery/Procedure:  Lumbar Transforaminal Epidural Steroid Injection - bilateral L3/4 and L4/5   Surgery/Procedure Date:  12/31/2024  Last visit: Office Visit with Eliot Alanis PA-C (01/22/2025)     Next visit: N/A     Reason for call:    Copied from HUB:    \"  reason for call: Patient sent a message to Dr. Omer about needing a referral and his prescription refilled - please see and advise     "

## 2025-05-06 NOTE — TELEPHONE ENCOUNTER
Rx Refill Note    Requested Prescriptions     Pending Prescriptions Disp Refills    dapagliflozin Propanediol (Farxiga) 10 MG tablet 90 tablet 0     Sig: Take 10 mg by mouth Daily.        Last office visit with prescribing clinician: 2/7/2025      Next office visit with prescribing clinician: Visit date not found   Last labs:   Last refill: 11/13/2024  Pharmacy (be sure to add in Epic). correct

## 2025-05-07 ENCOUNTER — TELEPHONE (OUTPATIENT)
Dept: PAIN MEDICINE | Facility: CLINIC | Age: 60
End: 2025-05-07
Payer: COMMERCIAL

## 2025-05-07 RX ORDER — GABAPENTIN 300 MG/1
300 CAPSULE ORAL 3 TIMES DAILY
Qty: 90 CAPSULE | Refills: 2 | OUTPATIENT
Start: 2025-05-07

## 2025-05-07 NOTE — TELEPHONE ENCOUNTER
S/w patient, pended gabapentin to be signed. Patient requests referral to provider who will accept his insurance.    (See 5/5 TE)

## 2025-05-08 ENCOUNTER — TELEPHONE (OUTPATIENT)
Dept: PAIN MEDICINE | Facility: CLINIC | Age: 60
End: 2025-05-08
Payer: COMMERCIAL

## 2025-05-08 DIAGNOSIS — M48.062 SPINAL STENOSIS OF LUMBAR REGION WITH NEUROGENIC CLAUDICATION: Primary | ICD-10-CM

## 2025-05-08 RX ORDER — DAPAGLIFLOZIN 10 MG/1
10 TABLET, FILM COATED ORAL DAILY
Qty: 90 TABLET | Refills: 0 | Status: SHIPPED | OUTPATIENT
Start: 2025-05-08

## 2025-05-08 NOTE — TELEPHONE ENCOUNTER
Spoke w/ pt, he indicated he wanted to be referred out to Dr. Kearney as [facility] is in network on his new ins.

## 2025-05-08 NOTE — TELEPHONE ENCOUNTER
CALLED PT ADVISED HIM REFERRAL WAS IN PROCESS, [facility] WAS NOT GOING TO BE PRESCRIBED, ADVISED PT TO CONTACT [facility] TO MAKE SURE REFERRAL ARRVD, FRI OR MON. PT DECIDED TO LEAVE PCP ON [facility], PT IS SEEKING NEW PCP

## 2025-05-08 NOTE — TELEPHONE ENCOUNTER
SPOKE W/ PT 2 TIMES RE: HIS INS. HIS BC/BS IS A [employer] PLAN THAT IS OON. I EXPLAINED HIS BENEFITS, DEDUCT IS CONSIDERABLE. PT IS NEEDING REFERRED OUT. INFORMED PT THE PERSON HE WANTS REFERRED TO IS NOT WHO WE REFER TO. DR. CARRERO WILL REFER TO DR JARAMILLO IF NEEDED SINCE HIS INS IS OON FOR [employer]. [facility] TAKES PT INS. PT DETERMINED NEEDS REFERRAL FROM PCP, OKAY TO RELAY. PT UNDERSTANDS THE INS INFO. WAITING FOR RICK MARR TO INFORM ME ABOUT A POSSIBLE RX [facility].

## 2025-05-08 NOTE — TELEPHONE ENCOUNTER
HUB TO RELAY, PT REFERRAL TO DR. JARAMILLO IS IN PROCESS. DR. CARRERO IS NOT GOING TO DO [facility] AS PT HAS TO HAVE OFFICE VISIT.